# Patient Record
Sex: MALE | Race: WHITE | Employment: OTHER
[De-identification: names, ages, dates, MRNs, and addresses within clinical notes are randomized per-mention and may not be internally consistent; named-entity substitution may affect disease eponyms.]

---

## 2017-01-03 ENCOUNTER — TELEPHONE (OUTPATIENT)
Dept: CASE MANAGEMENT | Age: 62
End: 2017-01-03

## 2017-04-20 ENCOUNTER — TELEPHONE (OUTPATIENT)
Dept: CASE MANAGEMENT | Age: 62
End: 2017-04-20

## 2017-07-20 ENCOUNTER — TELEPHONE (OUTPATIENT)
Dept: CASE MANAGEMENT | Age: 62
End: 2017-07-20

## 2018-04-11 PROBLEM — G47.33 OSA (OBSTRUCTIVE SLEEP APNEA): Status: ACTIVE | Noted: 2018-04-11

## 2018-04-20 ENCOUNTER — TELEPHONE (OUTPATIENT)
Dept: CASE MANAGEMENT | Age: 63
End: 2018-04-20

## 2018-07-13 ENCOUNTER — TELEPHONE (OUTPATIENT)
Dept: CASE MANAGEMENT | Age: 63
End: 2018-07-13

## 2018-07-13 NOTE — TELEPHONE ENCOUNTER
7/13/2018 No call, encounter opened for documentation in the lung nodule navigator flow sheet/ Lung screening patient. Annual CT lung screening reminder faxed to ordering physician and mailed to patient.     [address]

## 2018-07-13 NOTE — LETTER
Lung Screening Program      7/13/2018      [address] [place] [id number]      Dear Alpa Long,        Our records indicate that based on your lung screen performed at [facility] ( formerly [facility]), it is time to schedule your yearly lung screen. National guidelines for preventive care encourage patients who smoke, or who have a history of smoking, to receive yearly lung screens if you:    ? are between the ages of 50-69  ? have smoked a pack a day or more for 30 years (or its equivalent)  ? continue to smoke or have quit within the past 15 year    To schedule a lung screen you first need to have a discussion with your primary care physician and obtain an order. Screenings are now billed to your insurance, call our patient navigator for more information at [phone]. To schedule the screen call [phone] at your earliest convenience. If you had scans that were done elsewhere,  please call to inform me; we value you as a patient and want to ensure that you are getting the appropriate care. If you have any questions or need assistance in scheduling, please dont hesitate to call.            [address]:  [phone]  F:  [phone]        CC: Fredy Lund MD                                Medical Imaging Services      7/13/2018          Fredy Lund MD  [address] [place] [address], [address] [place] [address]  FAX:  [phone]      Dear Fredy Lund MD,    Our records indicate your patient, Alpa Alves (1955) is due for his annual CT Lung Screening if he still meets screening criteria:    - Age 50-69 years  - 30 pack year smoking history  - Current smoker or former smoker who has quit within the last 15 years  - Asymptomatic of Lung Cancer    Thank you for your commitment to your patients and our CT Lung Screening Program.   If you have any additional questions or concerns regarding our

## 2018-08-23 ENCOUNTER — HOSPITAL ENCOUNTER (OUTPATIENT)
Dept: CT IMAGING | Age: 63
Discharge: HOME | End: 2018-08-25
Payer: MEDICARE

## 2018-08-23 DIAGNOSIS — Z87.891 PERSONAL HISTORY OF TOBACCO USE: ICD-10-CM

## 2018-08-23 PROCEDURE — G0297 LDCT FOR LUNG CA SCREEN: HCPCS

## 2018-08-24 ENCOUNTER — TELEPHONE (OUTPATIENT)
Dept: CASE MANAGEMENT | Age: 63
End: 2018-08-24

## 2019-07-29 ENCOUNTER — TELEPHONE (OUTPATIENT)
Dept: CASE MANAGEMENT | Age: 64
End: 2019-07-29

## 2019-07-29 NOTE — TELEPHONE ENCOUNTER
No call, encounter opened to process CT Lung Screening.       CT Lung Screen 8/23/18 :  Impression   1. Multiple stable bilateral pulmonary nodules as described above. Recommend follow-up as per Fleischner criteria below. 2. No focal consolidation, pleural effusion or pneumothorax. 3. Mild pulmonary emphysematous disease. 4. Mediastinal lymphadenopathy of uncertain etiology and clinical   significance. Recommend clinical correlation. 5. Hypodense nodule in the right thyroid lobe. Recommend clinical   correlation with T3/T4 levels and TSH levels and, if clinically   warranted, dedicated thyroid sonogram can be performed for further   evaluation.             Pack years: 64          History   Smoking Status    Former Smoker    Packs/day: 1.00    Years: 56.00    Types: Cigarettes    Quit date: 4/24/2016   Smokeless Tobacco    Never Used         7/29/2019 No call, encounter opened for documentation in the lung nodule navigator flow sheet/ Lung screening patient. Annual CT lung screening reminder mailed to patient. Reminders will be sent every 30 days until this patient schedules their screening exam or exceeds 60 days past due. Once a screening is over 60 days past due, the patient will be removed from active program surveillance and future follow up reminders.       Fortino Benjamin

## 2019-09-13 ENCOUNTER — HOSPITAL ENCOUNTER (OUTPATIENT)
Dept: CT IMAGING | Age: 64
Discharge: HOME | End: 2019-09-15
Payer: MEDICARE

## 2019-09-13 DIAGNOSIS — R91.8 PULMONARY NODULES: ICD-10-CM

## 2019-09-13 PROCEDURE — 71250 CT THORAX DX C-: CPT

## 2019-10-21 ENCOUNTER — HOSPITAL ENCOUNTER (OUTPATIENT)
Dept: CT IMAGING | Age: 64
Discharge: HOME | End: 2019-10-23
Payer: MEDICARE

## 2019-10-21 DIAGNOSIS — J91.8 PLEURAL EFFUSION ASSOCIATED WITH HEPATIC DISORDER: ICD-10-CM

## 2019-10-21 DIAGNOSIS — K76.9 PLEURAL EFFUSION ASSOCIATED WITH HEPATIC DISORDER: ICD-10-CM

## 2019-10-21 PROCEDURE — 6360000004 HC RX CONTRAST MEDICATION: Performed by: RADIOLOGY

## 2019-10-21 PROCEDURE — 74177 CT ABD & PELVIS W/CONTRAST: CPT

## 2019-10-21 RX ADMIN — IOHEXOL 50 ML: 240 INJECTION, SOLUTION INTRATHECAL; INTRAVASCULAR; INTRAVENOUS; ORAL at 14:16

## 2019-10-21 RX ADMIN — IOPAMIDOL 110 ML: 755 INJECTION, SOLUTION INTRAVENOUS at 14:15

## 2021-06-16 ENCOUNTER — HOSPITAL ENCOUNTER (OUTPATIENT)
Dept: CT IMAGING | Age: 66
Discharge: HOME | End: 2021-06-18
Payer: MEDICARE

## 2021-06-16 DIAGNOSIS — R91.1 PULMONARY NODULE: ICD-10-CM

## 2021-06-16 PROCEDURE — 71250 CT THORAX DX C-: CPT

## 2021-09-01 ENCOUNTER — APPOINTMENT (OUTPATIENT)
Dept: GENERAL RADIOLOGY | Age: 66
DRG: 189 | End: 2021-09-01
Payer: MEDICARE

## 2021-09-01 ENCOUNTER — HOSPITAL ENCOUNTER (INPATIENT)
Age: 66
LOS: 2 days | Discharge: HOME | DRG: 189 | End: 2021-09-04
Attending: EMERGENCY MEDICINE | Admitting: FAMILY MEDICINE
Payer: MEDICARE

## 2021-09-01 DIAGNOSIS — J44.1 COPD EXACERBATION (HCC): Primary | ICD-10-CM

## 2021-09-01 DIAGNOSIS — R06.00 DYSPNEA, UNSPECIFIED TYPE: ICD-10-CM

## 2021-09-01 DIAGNOSIS — R09.02 HYPOXIA: ICD-10-CM

## 2021-09-01 LAB
ALBUMIN SERPL-MCNC: 4.7 G/DL (ref 3.5–5.2)
ALP BLD-CCNC: 76 U/L (ref 40–129)
ALT SERPL-CCNC: 23 U/L (ref 0–40)
ANION GAP SERPL CALCULATED.3IONS-SCNC: 14 MMOL/L (ref 7–16)
APTT: 34.4 SEC (ref 24.5–35.1)
AST SERPL-CCNC: 28 U/L (ref 0–39)
BASOPHILS ABSOLUTE: 0.04 E9/L (ref 0–0.2)
BASOPHILS RELATIVE PERCENT: 0.3 % (ref 0–2)
BILIRUB SERPL-MCNC: 0.3 MG/DL (ref 0–1.2)
BUN BLDV-MCNC: 9 MG/DL (ref 6–23)
CALCIUM SERPL-MCNC: 9.5 MG/DL (ref 8.6–10.2)
CHLORIDE BLD-SCNC: 99 MMOL/L (ref 98–107)
CO2: 27 MMOL/L (ref 22–29)
CREAT SERPL-MCNC: 0.7 MG/DL (ref 0.7–1.2)
EOSINOPHILS ABSOLUTE: 0.29 E9/L (ref 0.05–0.5)
EOSINOPHILS RELATIVE PERCENT: 2 % (ref 0–6)
GFR AFRICAN AMERICAN: >60
GFR NON-AFRICAN AMERICAN: >60 ML/MIN/1.73
GLUCOSE BLD-MCNC: 112 MG/DL (ref 74–99)
HCT VFR BLD CALC: 49.2 % (ref 37–54)
HEMOGLOBIN: 16.7 G/DL (ref 12.5–16.5)
IMMATURE GRANULOCYTES #: 0.05 E9/L
IMMATURE GRANULOCYTES %: 0.3 % (ref 0–5)
LYMPHOCYTES ABSOLUTE: 1.43 E9/L (ref 1.5–4)
LYMPHOCYTES RELATIVE PERCENT: 9.9 % (ref 20–42)
MAGNESIUM: 1.6 MG/DL (ref 1.6–2.6)
MCH RBC QN AUTO: 30.3 PG (ref 26–35)
MCHC RBC AUTO-ENTMCNC: 33.9 % (ref 32–34.5)
MCV RBC AUTO: 89.3 FL (ref 80–99.9)
MONOCYTES ABSOLUTE: 1.39 E9/L (ref 0.1–0.95)
MONOCYTES RELATIVE PERCENT: 9.6 % (ref 2–12)
NEUTROPHILS ABSOLUTE: 11.21 E9/L (ref 1.8–7.3)
NEUTROPHILS RELATIVE PERCENT: 77.9 % (ref 43–80)
PDW BLD-RTO: 13.1 FL (ref 11.5–15)
PLATELET # BLD: 335 E9/L (ref 130–450)
PMV BLD AUTO: 10.4 FL (ref 7–12)
POTASSIUM SERPL-SCNC: 4.3 MMOL/L (ref 3.5–5)
PRO-BNP: 512 PG/ML (ref 0–125)
RBC # BLD: 5.51 E12/L (ref 3.8–5.8)
SARS-COV-2, NAAT: NOT DETECTED
SODIUM BLD-SCNC: 140 MMOL/L (ref 132–146)
TOTAL PROTEIN: 8.6 G/DL (ref 6.4–8.3)
TROPONIN, HIGH SENSITIVITY: 12 NG/L (ref 0–11)
WBC # BLD: 14.4 E9/L (ref 4.5–11.5)

## 2021-09-01 PROCEDURE — 93005 ELECTROCARDIOGRAM TRACING: CPT | Performed by: NURSE PRACTITIONER

## 2021-09-01 PROCEDURE — 6370000000 HC RX 637 (ALT 250 FOR IP): Performed by: STUDENT IN AN ORGANIZED HEALTH CARE EDUCATION/TRAINING PROGRAM

## 2021-09-01 PROCEDURE — 94640 AIRWAY INHALATION TREATMENT: CPT

## 2021-09-01 PROCEDURE — 85730 THROMBOPLASTIN TIME PARTIAL: CPT

## 2021-09-01 PROCEDURE — 94660 CPAP INITIATION&MGMT: CPT

## 2021-09-01 PROCEDURE — 2700000000 HC OXYGEN THERAPY PER DAY

## 2021-09-01 PROCEDURE — 80053 COMPREHEN METABOLIC PANEL: CPT

## 2021-09-01 PROCEDURE — 87635 SARS-COV-2 COVID-19 AMP PRB: CPT

## 2021-09-01 PROCEDURE — 85025 COMPLETE CBC W/AUTO DIFF WBC: CPT

## 2021-09-01 PROCEDURE — 83880 ASSAY OF NATRIURETIC PEPTIDE: CPT

## 2021-09-01 PROCEDURE — 94664 DEMO&/EVAL PT USE INHALER: CPT

## 2021-09-01 PROCEDURE — 6360000002 HC RX W HCPCS: Performed by: STUDENT IN AN ORGANIZED HEALTH CARE EDUCATION/TRAINING PROGRAM

## 2021-09-01 PROCEDURE — 83735 ASSAY OF MAGNESIUM: CPT

## 2021-09-01 PROCEDURE — 99285 EMERGENCY DEPT VISIT HI MDM: CPT

## 2021-09-01 PROCEDURE — 84484 ASSAY OF TROPONIN QUANT: CPT

## 2021-09-01 PROCEDURE — 71045 X-RAY EXAM CHEST 1 VIEW: CPT

## 2021-09-01 RX ORDER — IPRATROPIUM BROMIDE AND ALBUTEROL SULFATE 2.5; .5 MG/3ML; MG/3ML
3 SOLUTION RESPIRATORY (INHALATION) ONCE
Status: COMPLETED | OUTPATIENT
Start: 2021-09-01 | End: 2021-09-01

## 2021-09-01 RX ORDER — METHYLPREDNISOLONE SODIUM SUCCINATE 125 MG/2ML
125 INJECTION, POWDER, LYOPHILIZED, FOR SOLUTION INTRAMUSCULAR; INTRAVENOUS ONCE
Status: COMPLETED | OUTPATIENT
Start: 2021-09-01 | End: 2021-09-01

## 2021-09-01 RX ADMIN — IPRATROPIUM BROMIDE AND ALBUTEROL SULFATE 3 AMPULE: .5; 2.5 SOLUTION RESPIRATORY (INHALATION) at 20:07

## 2021-09-01 RX ADMIN — METHYLPREDNISOLONE SODIUM SUCCINATE 125 MG: 125 INJECTION, POWDER, FOR SOLUTION INTRAMUSCULAR; INTRAVENOUS at 20:46

## 2021-09-01 ASSESSMENT — ENCOUNTER SYMPTOMS
APNEA: 0
EYE REDNESS: 0
COUGH: 1
TROUBLE SWALLOWING: 0
BACK PAIN: 0
WHEEZING: 0
VOMITING: 0
RHINORRHEA: 0
CHEST TIGHTNESS: 0
NAUSEA: 0
SHORTNESS OF BREATH: 1
SORE THROAT: 0
ABDOMINAL PAIN: 0
PHOTOPHOBIA: 0
EYE PAIN: 0
CONSTIPATION: 0
DIARRHEA: 0

## 2021-09-01 NOTE — ED NOTES
FIRST PROVIDER CONTACT ASSESSMENT NOTE                                                                                                Department of Emergency Medicine                                                      First Provider Note  21  7:40 PM EDT  NAME: Ronda Cruz  : 1955  MRN: [id number]    Chief Complaint: Shortness of Breath (pt reports increased sob and cough. )      History of Present Illness:   Ronda Cruz is a 74 y.o. male who presents to the ED for sudden onset of severe shortness of breath. Patient presents emergency department, states that at baseline he does have COPD does not require any oxygen, he just drove in from [place] he was there for a day, states that there he began to feel very short of breath and ambulance was in the parking lot they checked an EKG on him and patient then drove home. Patient hypoxic on arrival here 87% on room air. States day before he started having a sore throat. Patient notably distressed. Focused Physical Exam:  VS:    ED Triage Vitals [21]   BP Temp Temp src Pulse Resp SpO2 Height Weight   -- 96.8 °F (36 °C) -- 107 -- (!) 87 % -- --        General: Alert and in no apparent distress. Medical History:  has a past medical history of Hypertension and Sleep apnea. Surgical History:  has a past surgical history that includes eye surgery; Retinal detachment surgery (Right); and repair retinal tear/hole (Right). Social History:  reports that he quit smoking about 5 years ago. His smoking use included cigarettes. He started smoking about 59 years ago. He has a 56.00 pack-year smoking history. He has never used smokeless tobacco. He reports current alcohol use. He reports that he does not use drugs. Family History: Family history is unknown by patient. Allergies: Patient has no known allergies.      Initial Plan of Care:  Initiate Treatment-Testing, Proceed toTreatment Area When Bed Available for ED Attending/MLP to Continue Care    -------------------------------------------------END OF FIRST PROVIDER CONTACT ASSESSMENT NOTE--------------------------------------------------------  Electronically signed by IMKO Ley CNP   DD: 9/1/21         MIKO Ley CNP  09/01/21 1941

## 2021-09-01 NOTE — ED NOTES
Bed: 17A-17  Expected date:   Expected time:   Means of arrival:   Comments:  Triage male     Jeffry Oliva RN  09/01/21 1942

## 2021-09-02 ENCOUNTER — APPOINTMENT (OUTPATIENT)
Dept: CT IMAGING | Age: 66
DRG: 189 | End: 2021-09-02
Payer: MEDICARE

## 2021-09-02 PROBLEM — J44.1 COPD EXACERBATION (HCC): Status: ACTIVE | Noted: 2021-09-02

## 2021-09-02 LAB
ADENOVIRUS BY PCR: NOT DETECTED
BORDETELLA PARAPERTUSSIS BY PCR: NOT DETECTED
BORDETELLA PERTUSSIS BY PCR: NOT DETECTED
CHLAMYDOPHILIA PNEUMONIAE BY PCR: NOT DETECTED
CORONAVIRUS 229E BY PCR: NOT DETECTED
CORONAVIRUS HKU1 BY PCR: NOT DETECTED
CORONAVIRUS NL63 BY PCR: NOT DETECTED
CORONAVIRUS OC43 BY PCR: NOT DETECTED
EKG ATRIAL RATE: 78 BPM
EKG P AXIS: 77 DEGREES
EKG P-R INTERVAL: 166 MS
EKG Q-T INTERVAL: 398 MS
EKG QRS DURATION: 86 MS
EKG QTC CALCULATION (BAZETT): 453 MS
EKG R AXIS: 64 DEGREES
EKG T AXIS: 45 DEGREES
EKG VENTRICULAR RATE: 78 BPM
HUMAN METAPNEUMOVIRUS BY PCR: NOT DETECTED
HUMAN RHINOVIRUS/ENTEROVIRUS BY PCR: DETECTED
INFLUENZA A BY PCR: NOT DETECTED
INFLUENZA B BY PCR: NOT DETECTED
MYCOPLASMA PNEUMONIAE BY PCR: NOT DETECTED
PARAINFLUENZA VIRUS 1 BY PCR: NOT DETECTED
PARAINFLUENZA VIRUS 2 BY PCR: NOT DETECTED
PARAINFLUENZA VIRUS 3 BY PCR: NOT DETECTED
PARAINFLUENZA VIRUS 4 BY PCR: NOT DETECTED
RESPIRATORY SYNCYTIAL VIRUS BY PCR: NOT DETECTED
SARS-COV-2, PCR: NOT DETECTED
TROPONIN, HIGH SENSITIVITY: 10 NG/L (ref 0–11)

## 2021-09-02 PROCEDURE — 94640 AIRWAY INHALATION TREATMENT: CPT

## 2021-09-02 PROCEDURE — 6370000000 HC RX 637 (ALT 250 FOR IP): Performed by: FAMILY MEDICINE

## 2021-09-02 PROCEDURE — 84484 ASSAY OF TROPONIN QUANT: CPT

## 2021-09-02 PROCEDURE — 36415 COLL VENOUS BLD VENIPUNCTURE: CPT

## 2021-09-02 PROCEDURE — 94660 CPAP INITIATION&MGMT: CPT

## 2021-09-02 PROCEDURE — 6360000002 HC RX W HCPCS: Performed by: FAMILY MEDICINE

## 2021-09-02 PROCEDURE — 6370000000 HC RX 637 (ALT 250 FOR IP): Performed by: NURSE PRACTITIONER

## 2021-09-02 PROCEDURE — 2580000003 HC RX 258: Performed by: FAMILY MEDICINE

## 2021-09-02 PROCEDURE — 6360000002 HC RX W HCPCS: Performed by: INTERNAL MEDICINE

## 2021-09-02 PROCEDURE — 6360000004 HC RX CONTRAST MEDICATION: Performed by: RADIOLOGY

## 2021-09-02 PROCEDURE — 2700000000 HC OXYGEN THERAPY PER DAY

## 2021-09-02 PROCEDURE — 2140000000 HC CCU INTERMEDIATE R&B

## 2021-09-02 PROCEDURE — 0202U NFCT DS 22 TRGT SARS-COV-2: CPT

## 2021-09-02 PROCEDURE — 71275 CT ANGIOGRAPHY CHEST: CPT

## 2021-09-02 RX ORDER — AMLODIPINE BESYLATE AND BENAZEPRIL HYDROCHLORIDE 5; 10 MG/1; MG/1
1 CAPSULE ORAL DAILY
Status: DISCONTINUED | OUTPATIENT
Start: 2021-09-02 | End: 2021-09-02 | Stop reason: CLARIF

## 2021-09-02 RX ORDER — PREDNISONE 20 MG/1
40 TABLET ORAL DAILY
Status: DISCONTINUED | OUTPATIENT
Start: 2021-09-04 | End: 2021-09-04 | Stop reason: HOSPADM

## 2021-09-02 RX ORDER — ACETAMINOPHEN 325 MG/1
650 TABLET ORAL EVERY 6 HOURS PRN
Status: DISCONTINUED | OUTPATIENT
Start: 2021-09-02 | End: 2021-09-04 | Stop reason: HOSPADM

## 2021-09-02 RX ORDER — ASPIRIN 81 MG/1
81 TABLET, CHEWABLE ORAL DAILY
Status: DISCONTINUED | OUTPATIENT
Start: 2021-09-02 | End: 2021-09-04 | Stop reason: HOSPADM

## 2021-09-02 RX ORDER — POLYETHYLENE GLYCOL 3350 17 G/17G
17 POWDER, FOR SOLUTION ORAL DAILY PRN
Status: DISCONTINUED | OUTPATIENT
Start: 2021-09-02 | End: 2021-09-04 | Stop reason: HOSPADM

## 2021-09-02 RX ORDER — AMLODIPINE BESYLATE 5 MG/1
5 TABLET ORAL DAILY
Status: DISCONTINUED | OUTPATIENT
Start: 2021-09-02 | End: 2021-09-04 | Stop reason: HOSPADM

## 2021-09-02 RX ORDER — METHYLPREDNISOLONE SODIUM SUCCINATE 125 MG/2ML
40 INJECTION, POWDER, LYOPHILIZED, FOR SOLUTION INTRAMUSCULAR; INTRAVENOUS EVERY 6 HOURS
Status: COMPLETED | OUTPATIENT
Start: 2021-09-02 | End: 2021-09-03

## 2021-09-02 RX ORDER — ATORVASTATIN CALCIUM 20 MG/1
20 TABLET, FILM COATED ORAL DAILY
Status: DISCONTINUED | OUTPATIENT
Start: 2021-09-02 | End: 2021-09-04 | Stop reason: HOSPADM

## 2021-09-02 RX ORDER — LISINOPRIL 10 MG/1
10 TABLET ORAL DAILY
Status: DISCONTINUED | OUTPATIENT
Start: 2021-09-02 | End: 2021-09-04 | Stop reason: HOSPADM

## 2021-09-02 RX ORDER — ACETAMINOPHEN 650 MG/1
650 SUPPOSITORY RECTAL EVERY 6 HOURS PRN
Status: DISCONTINUED | OUTPATIENT
Start: 2021-09-02 | End: 2021-09-04 | Stop reason: HOSPADM

## 2021-09-02 RX ORDER — AZITHROMYCIN 250 MG/1
500 TABLET, FILM COATED ORAL DAILY
Status: DISCONTINUED | OUTPATIENT
Start: 2021-09-02 | End: 2021-09-04 | Stop reason: HOSPADM

## 2021-09-02 RX ORDER — IPRATROPIUM BROMIDE AND ALBUTEROL SULFATE 2.5; .5 MG/3ML; MG/3ML
1 SOLUTION RESPIRATORY (INHALATION)
Status: DISCONTINUED | OUTPATIENT
Start: 2021-09-02 | End: 2021-09-04 | Stop reason: HOSPADM

## 2021-09-02 RX ORDER — SODIUM CHLORIDE 9 MG/ML
INJECTION, SOLUTION INTRAVENOUS CONTINUOUS
Status: DISCONTINUED | OUTPATIENT
Start: 2021-09-02 | End: 2021-09-04 | Stop reason: HOSPADM

## 2021-09-02 RX ORDER — ARFORMOTEROL TARTRATE 15 UG/2ML
15 SOLUTION RESPIRATORY (INHALATION) 2 TIMES DAILY
Status: DISCONTINUED | OUTPATIENT
Start: 2021-09-02 | End: 2021-09-04 | Stop reason: HOSPADM

## 2021-09-02 RX ORDER — ALBUTEROL SULFATE 2.5 MG/3ML
2.5 SOLUTION RESPIRATORY (INHALATION)
Status: DISCONTINUED | OUTPATIENT
Start: 2021-09-02 | End: 2021-09-04 | Stop reason: HOSPADM

## 2021-09-02 RX ORDER — ONDANSETRON 4 MG/1
4 TABLET, ORALLY DISINTEGRATING ORAL EVERY 8 HOURS PRN
Status: DISCONTINUED | OUTPATIENT
Start: 2021-09-02 | End: 2021-09-04 | Stop reason: HOSPADM

## 2021-09-02 RX ORDER — ONDANSETRON 2 MG/ML
4 INJECTION INTRAMUSCULAR; INTRAVENOUS EVERY 6 HOURS PRN
Status: DISCONTINUED | OUTPATIENT
Start: 2021-09-02 | End: 2021-09-04 | Stop reason: HOSPADM

## 2021-09-02 RX ORDER — BUDESONIDE 0.5 MG/2ML
0.5 INHALANT ORAL 2 TIMES DAILY
Status: DISCONTINUED | OUTPATIENT
Start: 2021-09-02 | End: 2021-09-04 | Stop reason: HOSPADM

## 2021-09-02 RX ADMIN — METHYLPREDNISOLONE SODIUM SUCCINATE 40 MG: 125 INJECTION, POWDER, FOR SOLUTION INTRAMUSCULAR; INTRAVENOUS at 20:00

## 2021-09-02 RX ADMIN — AZITHROMYCIN 500 MG: 250 TABLET, FILM COATED ORAL at 14:10

## 2021-09-02 RX ADMIN — AMLODIPINE BESYLATE 5 MG: 5 TABLET ORAL at 08:19

## 2021-09-02 RX ADMIN — IPRATROPIUM BROMIDE AND ALBUTEROL SULFATE 1 AMPULE: .5; 2.5 SOLUTION RESPIRATORY (INHALATION) at 16:04

## 2021-09-02 RX ADMIN — ASPIRIN 81 MG CHEWABLE TABLET 81 MG: 81 TABLET CHEWABLE at 08:19

## 2021-09-02 RX ADMIN — ARFORMOTEROL TARTRATE 15 MCG: 15 SOLUTION RESPIRATORY (INHALATION) at 19:55

## 2021-09-02 RX ADMIN — SODIUM CHLORIDE: 9 INJECTION, SOLUTION INTRAVENOUS at 18:00

## 2021-09-02 RX ADMIN — IOPAMIDOL 75 ML: 755 INJECTION, SOLUTION INTRAVENOUS at 00:09

## 2021-09-02 RX ADMIN — METHYLPREDNISOLONE SODIUM SUCCINATE 40 MG: 125 INJECTION, POWDER, FOR SOLUTION INTRAMUSCULAR; INTRAVENOUS at 04:19

## 2021-09-02 RX ADMIN — SODIUM CHLORIDE: 9 INJECTION, SOLUTION INTRAVENOUS at 04:21

## 2021-09-02 RX ADMIN — IPRATROPIUM BROMIDE AND ALBUTEROL SULFATE 1 AMPULE: .5; 2.5 SOLUTION RESPIRATORY (INHALATION) at 19:56

## 2021-09-02 RX ADMIN — ATORVASTATIN CALCIUM 20 MG: 20 TABLET, FILM COATED ORAL at 08:19

## 2021-09-02 RX ADMIN — IPRATROPIUM BROMIDE AND ALBUTEROL SULFATE 1 AMPULE: .5; 2.5 SOLUTION RESPIRATORY (INHALATION) at 08:10

## 2021-09-02 RX ADMIN — ENOXAPARIN SODIUM 40 MG: 40 INJECTION SUBCUTANEOUS at 08:19

## 2021-09-02 RX ADMIN — IPRATROPIUM BROMIDE AND ALBUTEROL SULFATE 1 AMPULE: .5; 2.5 SOLUTION RESPIRATORY (INHALATION) at 12:00

## 2021-09-02 RX ADMIN — LISINOPRIL 10 MG: 10 TABLET ORAL at 08:19

## 2021-09-02 RX ADMIN — METHYLPREDNISOLONE SODIUM SUCCINATE 40 MG: 125 INJECTION, POWDER, FOR SOLUTION INTRAMUSCULAR; INTRAVENOUS at 10:07

## 2021-09-02 RX ADMIN — METHYLPREDNISOLONE SODIUM SUCCINATE 40 MG: 125 INJECTION, POWDER, FOR SOLUTION INTRAMUSCULAR; INTRAVENOUS at 16:29

## 2021-09-02 RX ADMIN — BUDESONIDE 500 MCG: 0.5 SUSPENSION RESPIRATORY (INHALATION) at 19:55

## 2021-09-02 ASSESSMENT — PAIN SCALES - GENERAL
PAINLEVEL_OUTOF10: 0

## 2021-09-02 NOTE — CONSULTS
Xochitl Beltran M.D.,[facility]  Hayley Chavarria D.O., F.A.CLARE.ORITA., Jay Clement M.D. Stone Metcalf M.D. Ellie Ramírez D.O. Patient:  Grace Narvaez 74 y.o. male MRN: [id number]     Date of Service: 9/2/2021      PULMONARY CONSULTATION    Reason for Consultation: COPD exacerbation  Referring Physician: Dr. Renetta Mcclellan with the referring physician will be sent via the electronic medical record. Chief Complaint: shortness of breath    CODE STATUS: full code    SUBJECTIVE: full code  HPI:  Grace Narvaez is a 74 y.o. male with a past medical history of hypertension, FRANCOISE on CPAP, noncompliance with treatment, and stage 2 COPD, previously known in our practice to Dr. Cynthia Sanabria who initially presented to the ED for sudden onset severe shortness of breath. He was hypoxic on presentation at 87%. He did have a sore throat for one day, before he started with the shortness of breath. Patient was seen and examined lying in bed in no apparent distress. He says he has had no more wheezing since getting a breathing treatment. He uses Anoro at home, but does not have a rescue inhaler and has not worn his CPAP in a couple of years. He is still smoking cigars. He has an occasional cough with clear sputum.      Past Medical History:   Diagnosis Date    Hypertension     Sleep apnea        Past Surgical History:   Procedure Laterality Date    EYE SURGERY      REPAIR RETINAL TEAR/HOLE Right     RETINAL DETACHMENT SURGERY Right        Family History   Family history unknown: Yes       Social History:   Social History     Socioeconomic History    Marital status:      Spouse name: Not on file    Number of children: Not on file    Years of education: Not on file    Highest education level: Not on file   Occupational History    Occupation: retired-firefoe   Tobacco Use    Smoking status: Former Smoker     Packs/day: 1.00     Years: 56.00     Pack years: 56.00     Types: Cigarettes     Start date: 1962     Quit date: 2016     Years since quittin.3    Smokeless tobacco: Never Used   Vaping Use    Vaping Use: Never used   Substance and Sexual Activity    Alcohol use: Yes     Alcohol/week: 0.0 standard drinks     Comment: occasional    Drug use: No    Sexual activity: Not Currently     Partners: Female   Other Topics Concern    Not on file   Social History Narrative    Not on file     Social Determinants of Health     Financial Resource Strain:     Difficulty of Paying Living Expenses:    Food Insecurity:     Worried About Running Out of Food in the Last Year:     [address] in the Last Year:    Transportation Needs:     Lack of Transportation (Medical):  Lack of Transportation (Non-Medical):    Physical Activity:     Days of Exercise per Week:     Minutes of Exercise per Session:    Stress:     Feeling of Stress :    Social Connections:     Frequency of Communication with Friends and Family:     Frequency of Social Gatherings with Friends and Family:     Attends Worship Services:     Active Member of Clubs or Organizations:     Attends Club or Organization Meetings:     Marital Status:    Intimate Partner Violence:     Fear of Current or Ex-Partner:     Emotionally Abused:     Physically Abused:     Sexually Abused:      Smoking history: The patient is a Current smoker cigars. He is a former cigarette smoker of one pack a day for 45 years. Pack year equal 45. ETOH:   reports current alcohol use. Exposures: There  is not history of TB or TB exposure. There is asbestos or silica dust exposure. The patient reports does not have coal, foundry, quarry or [employer] exposure. 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 There is not  history of recreational or IV drug use. There is hot tub exposure at home but not very recently.  The patient does not have any exotic pets, turtles or exotic birds.        Vaccines:    Influenza:  Up-to-date  Pneumococcal Polysaccharide:  Up-to-date; approximate date: 2017  covid vaccine current  Immunization History   Administered Date(s) Administered    COVID-19, Pfizer, PF, 30mcg/0.3mL 02/25/2021, 03/18/2021    Influenza Vaccine, unspecified formulation 09/28/2016    Influenza, MDCK Quadv, with preserv IM (Flucelvax 2 yrs and older) 10/10/2018, 10/17/2019    Influenza, Bae Pulse, IM, PF (6 mo and older Fluzone, Flulaval, Fluarix, and 3 yrs and older Afluria) 01/06/2018    Influenza, Triv, 3 Years and older, IM (Afluria (5 yrs and older) 09/28/2016    Pneumococcal Polysaccharide ([id number]) 09/13/2017    Tdap (Boostrix, Adacel) 04/16/2019        Home Meds: Medications Prior to Admission: Gayatri Delgado 9-4.8 MCG/ACT AERO, USE 2 INHALATIONS TWICE A DAY  [employer] Natural Products (OSTEO BI-FLEX JOINT SHIELD) TABS, Take by mouth daily  simvastatin (ZOCOR) 40 MG tablet, Take 40 mg by mouth daily   Polyvinyl Alcohol-Povidone (REFRESH OP), Apply to eye  aspirin 81 MG tablet, Take 81 mg by mouth daily  Multiple Vitamins-Minerals (THERAPEUTIC MULTIVITAMIN-MINERALS) tablet, Take 1 tablet by mouth daily  LOTEMAX 0.5 % ophthalmic gel,   amLODIPine-benazepril (LOTREL) 5-10 MG per capsule, Take 1 capsule by mouth daily  albuterol sulfate (PROAIR RESPICLICK) 079 (90 BASE) MCG/ACT aerosol powder inhalation, Inhale 2 puffs into the lungs every 4 hours as needed for Wheezing or Shortness of Breath    CURRENT MEDS :  Scheduled Meds:   aspirin  81 mg Oral Daily    atorvastatin  20 mg Oral Daily    enoxaparin  40 mg SubCUTAneous Daily    ipratropium-albuterol  1 ampule Inhalation Q4H WA    methylPREDNISolone  40 mg IntraVENous Q6H    Followed by   Mahnaz Shin ON 9/4/2021] predniSONE  40 mg Oral Daily    amLODIPine  5 mg Oral Daily    And    lisinopril  10 mg Oral Daily       Continuous Infusions:   sodium chloride 75 mL/hr at 09/02/21 0421       No Known Allergies    REVIEW OF SYSTEMS:  Constitutional: Denies fever, weight loss, night sweats, and fatigue  Skin: Denies pigmentation, dark lesions, and rashes   HEENT: Denies hearing loss, tinnitus, ear drainage, epistaxis, sore throat, and hoarseness. Cardiovascular: Denies palpitations, chest pain, and chest pressure. Respiratory: Denies productive cough, dyspnea at rest, hemoptysis, apnea, and choking. Gastrointestinal: Denies nausea, vomiting, poor appetite, diarrhea, heartburn or reflux  Genitourinary: Denies dysuria, frequency, urgency or hematuria  Musculoskeletal: Denies myalgias, muscle weakness, and bone pain  Neurological: Denies dizziness, vertigo, headache, and focal weakness  Psychological: Denies anxiety and depression  Endocrine: Denies heat intolerance and cold intolerance  Hematopoietic/Lymphatic: Denies bleeding problems and blood transfusions    OBJECTIVE:   /85   Pulse 75   Temp 97.6 °F (36.4 °C) (Temporal)   Resp 18   Ht 5' 9\" (1.753 m)   Wt 168 lb 3.2 oz (76.3 kg)   SpO2 97%   BMI 24.84 kg/m²   SpO2 Readings from Last 1 Encounters:   09/02/21 97%        I/O:    Intake/Output Summary (Last 24 hours) at 9/2/2021 1023  Last data filed at 9/2/2021 0536  Gross per 24 hour   Intake 360 ml   Output --   Net 360 ml     Vent Information  FiO2 : 40 %  SpO2: 97 %  SpO2/FiO2 ratio: 250  I Time/ I Time %: 0.9 s  Mask Type: Full face mask  Mask Size: Large       IPAP: 14 cmH20  CPAP/EPAP: 8 cmH2O     Physical Exam:  General: The patient is lying in bed comfortably without any distress. Breathing is not labored  HEENT: Pupils are equal round and reactive to light, there are no oral lesions and no post-nasal drip   Neck: supple without adenopathy  Cardiovascular: regular rate and rhythm without murmur or gallop  Respiratory: Clear to auscultation bilaterally without wheezing or crackles.   Air entry is symmetric  Abdomen: soft, non-tender, non-distended, normal bowel sounds  Extremities: warm, no edema, no clubbing  Skin: no rash or lesion  Neurologic: CN II-XII grossly intact, no focal deficits    Pulmonary Function Testing personally reviewed and interpreted  Last done in 2016    Imaging personally reviewed:  9/2/2021 CTA chest  No evidence of pulmonary embolism or acute pulmonary abnormality.       Moderate emphysema. 9/1/2021 cxr  No acute process. Echo:  None on file    Labs:  Lab Results   Component Value Date    WBC 14.4 09/01/2021    HGB 16.7 09/01/2021    HCT 49.2 09/01/2021    MCV 89.3 09/01/2021    MCH 30.3 09/01/2021    MCHC 33.9 09/01/2021    RDW 13.1 09/01/2021     09/01/2021    MPV 10.4 09/01/2021     Lab Results   Component Value Date     09/01/2021    K 4.3 09/01/2021    CL 99 09/01/2021    CO2 27 09/01/2021    BUN 9 09/01/2021    CREATININE 0.7 09/01/2021    LABALBU 4.7 09/01/2021    CALCIUM 9.5 09/01/2021    GFRAA >60 09/01/2021    LABGLOM >60 09/01/2021     No results found for: PROTIME, INR  Recent Labs     09/01/21 2038   PROBNP 512*       Assessment:  1. COPD with acute exacerbation  2. FRANCOISE not compliant with CPAP treatment  3. Acute respiratory insufficiency with hypoxia   4. hyperlipidemia    Plan:  1. Oxygen therapy as needed, currently on 5L, wean as tolerated  2. Brovana/budesonide via nebulizers BID  3. Duonebs q4 hours  4. Solu medrol to Prednisone wean  5. Azithromycin 500mg daily x5 days  6. DVT prophylaxis    Thank you for allowing me to participate in the care of Ronda Cruz. Please feel free to call with questions. This plan of care was reviewed in collaboration with Dr. Anthony Crocker    Electronically signed by MIKO Colunga CNP on 9/2/2021 at 10:23 AM      Note: This report was completed utilizing computer voice recognition software. Every effort has been made to ensure accuracy, however; inadvertent computerized transcription errors may be present    I personally saw, examined, and cared for the patient.  Labs, medications, radiographs reviewed. I agree with history exam and plans detailed in NP note with the following additions:    Mr. Severa Moan is a 74year old male formerly known to Dr. Meagan Stockton with a history of COPD who presented with URI symptoms, wheezing, SOB, and cough. CTA of the chest did not show any PE or pneumonia. He was wheezing upon arrival.  For me today he had a faint end exp wheeze. We will treat him for a copd exacerbation with IV steroids, bronchodilators, azithromycin. Needs smoking cessation. Check a full viral panel.     Electronically signed by Terry Bowman MD on 9/2/2021 at 12:52 PM

## 2021-09-02 NOTE — CARE COORDINATION
Transition of care: Pulmonary consulted. Met with pt in room. Pt lives with his wife, Annabella, in a 2 story home. Independent with ADLs and drives. Not a . DME- CPAP and a pulse ox. Discharge plan is to return home. If oxygen is needed, he is agreeable to use [facility] as long as they accept his insurance. PCP is Dr LAZARA Price and pharmacy is [facility] order and [facility] in [facility].  Sw/cm will follow

## 2021-09-02 NOTE — PROGRESS NOTES
Date: 9/1/2021    Time: 8:16 PM    Patient Placed On BIPAP/CPAP/ Non-Invasive Ventilation? Yes    If no must comment. Facial area red/color change? No           If YES are Blister/Lesion present? No   If yes must notify nursing staff  BIPAP/CPAP skin barrier?   Yes    Skin barrier type:mepilexlite       Comments:        Carloyn Romberg, RCP

## 2021-09-02 NOTE — H&P
[address]                  [address] [facility],  [facility]                              HISTORY AND PHYSICAL    PATIENT NAME: Kayden Amin                       :        1955  MED REC NO:   [id number]                            ROOM:       1325  ACCOUNT NO:   [de-identified]                           ADMIT DATE: 2021  PROVIDER:     Dalila Stafford MD    CHIEF COMPLAINT:  Shortness of breath. HISTORY OF PRESENTING ILLNESS:  A 66-year-old with a history of  pulmonary nodules, emphysema, who presented to the emergency room with  increasing shortness of breath. The patient states he started feeling  sick on Monday with cough, runny nose, but no fever. States breathing  became more labored as the week went on prompting him to come to the  emergency room yesterday. He was found to be wheezing and hypoxic and  admitted for the same. The patient states he feels somewhat better. Denies any fever or chills. COVID was negative. He has had both  vaccines. RECENT AND PRESENT MEDICATION:  See med rec in the chart. PAST MEDICAL HISTORY:  Positive for emphysema, positive for pulmonary  nodules which is followed by the pulmonologist, positive for sleep  apnea, benign thyroid nodule, and hypertension. SOCIAL HISTORY:  Quit smoking in 2016. Denies alcohol use. FAMILY MEDICAL HISTORY:  Noncontributory. ALLERGIES:  None known. REVIEW OF SYSTEMS:  SKIN/LYMPHATICS:  Negative. CENTRAL NERVOUS SYSTEM:  Negative. CIRCULATORY:  Negative. RESPIRATORY:  See HPI. DIGESTIVE:  Denies nausea, vomiting, diarrhea, melena, hematochezia. GENITOURINARY:  Denies dysuria, frequency, hematuria. MUSCULOSKELETAL:  Negative. PHYSICAL EXAMINATION:  GENERAL:  The patient is sitting in bed, in no acute distress. VITAL SIGNS:  Temperature 97.6, pulse 75, respirations 18, blood  pressure 131/85, O2 sat is 98% on nasal cannula.   NECK:  Supple without bruits or masses. HEART:  Distant, but regular rate and rhythm. CHEST:  Does show decreased aeration with end-expiratory wheezing  throughout. ABDOMEN:  Soft, nontender without any mass. EXTREMITIES:  Reveal no cyanosis, clubbing, or edema and no calf  tenderness. NEUROLOGICAL:  He is grossly intact. LABORATORY DATA:  CMP basically unremarkable. CBC:  White count is  14.4, hemoglobin 16.7. DIAGNOSTIC DATA:  CT of the chest shows no PE, does show emphysema. COVID test was negative. DIAGNOSIS:  Exacerbation of COPD, possibly viral illness, does not  appear to be COVID at this point. PLAN:  The patient was admitted. IV fluids. Aerosols, O2, steroids. Pulmonary consultation. DVT prophylaxis. Discharge plan will be home  once stable.         Tulio Villalobos MD    D: 09/02/2021 8:33:17       T: 09/02/2021 8:36:21     [email or web address]  Job#: [id number]     Doc#: [id number]    CC:

## 2021-09-02 NOTE — PROGRESS NOTES
Call placed to Dr. Da Silva Sailors answering service for admission orders. Will await further orders.

## 2021-09-02 NOTE — PROGRESS NOTES
Physician Progress Note      PATIENTDususan Posey  CSN #:                  [id number]  :                       1955  ADMIT DATE:       2021 7:42 PM  [address] DATE:  RESPONDING  PROVIDER #:        RICHELLE MIR MD          QUERY TEXT:    Pt admitted with COPDE. Pt noted to have hypoxia. If possible, please   document in the progress notes and discharge summary if you are evaluating   and/or treating any of the following: The medical record reflects the following:  Risk Factors: COPD  Clinical Indicators: Rachel CROUCH-CNP in ED note \"at baseline he does   have COPD does not require any oxygen\" and \" Patient hypoxic on arrival here   87% on room air. \", Dr. Jese Serrano in ED Provider Note \"Diminished for sounds   bilaterally. Expiratory wheezing. Mild respiratory distress. Hypoxic on   room air. \", R 18 - 28. Treatment: BIPAP, continuous pulse ox, tele, admitted to   intermediate/progressive care, Duoneb  q 4 hours while awake, IV Solu-Medrol. Thank you,  Catherine Marsh BSN, RN, CDS  [phone]  Options provided:  -- Acute respiratory failure with hypoxia  -- Other - I will add my own diagnosis  -- Disagree - Not applicable / Not valid  -- Disagree - Clinically unable to determine / Unknown  -- Refer to Clinical Documentation Reviewer    PROVIDER RESPONSE TEXT:    This patient is in acute respiratory failure with hypoxia.     Query created by: Melanie Odell on 2021 1:18 PM      Electronically signed by:  Shannan Siegel MD 2021 1:25 PM

## 2021-09-02 NOTE — ED NOTES
Okay to send to floor with oxygen at [facility] per respiratory. Pt tolerated 6lnc to CT and back with no distress shorten of breaths.       Lee Cleary RN  09/02/21 [id number]

## 2021-09-02 NOTE — PATIENT CARE CONFERENCE
[facility] Quality Flow/Interdisciplinary Rounds Progress Note        Quality Flow Rounds held on September 2, 2021    Disciplines Attending:  Bedside Nurse, ,  and Nursing Unit Leadership    Cullen Patrick was admitted on 9/1/2021  7:42 PM    Anticipated Discharge Date:  Expected Discharge Date: 09/04/21    Disposition:    Yung Score:  Yung Scale Score: 23    Readmission Risk              Risk of Unplanned Readmission:  10           Discussed patient goal for the day, patient clinical progression, and barriers to discharge.   The following Goal(s) of the Day/Commitment(s) have been identified:  Diagnostics - Report Results      Margot Singh RN  September 2, 2021

## 2021-09-03 PROCEDURE — 2060000000 HC ICU INTERMEDIATE R&B

## 2021-09-03 PROCEDURE — 94640 AIRWAY INHALATION TREATMENT: CPT

## 2021-09-03 PROCEDURE — 6370000000 HC RX 637 (ALT 250 FOR IP): Performed by: NURSE PRACTITIONER

## 2021-09-03 PROCEDURE — 6360000002 HC RX W HCPCS: Performed by: FAMILY MEDICINE

## 2021-09-03 PROCEDURE — 6370000000 HC RX 637 (ALT 250 FOR IP): Performed by: FAMILY MEDICINE

## 2021-09-03 PROCEDURE — 6360000002 HC RX W HCPCS: Performed by: INTERNAL MEDICINE

## 2021-09-03 PROCEDURE — 2580000003 HC RX 258: Performed by: FAMILY MEDICINE

## 2021-09-03 PROCEDURE — 94660 CPAP INITIATION&MGMT: CPT

## 2021-09-03 RX ORDER — ALBUTEROL SULFATE 2.5 MG/3ML
2.5 SOLUTION RESPIRATORY (INHALATION) 4 TIMES DAILY
Qty: 120 EACH | Refills: 3 | Status: SHIPPED | OUTPATIENT
Start: 2021-09-03

## 2021-09-03 RX ORDER — ALBUTEROL SULFATE 90 UG/1
2 AEROSOL, METERED RESPIRATORY (INHALATION) EVERY 6 HOURS PRN
Qty: 18 G | Refills: 6 | Status: SHIPPED | OUTPATIENT
Start: 2021-09-03 | End: 2022-05-18 | Stop reason: SDUPTHER

## 2021-09-03 RX ORDER — UMECLIDINIUM BROMIDE AND VILANTEROL TRIFENATATE 62.5; 25 UG/1; UG/1
1 POWDER RESPIRATORY (INHALATION) DAILY
Qty: 30 EACH | Refills: 3 | Status: SHIPPED | OUTPATIENT
Start: 2021-09-03 | End: 2022-04-14 | Stop reason: ALTCHOICE

## 2021-09-03 RX ADMIN — AZITHROMYCIN 500 MG: 250 TABLET, FILM COATED ORAL at 10:06

## 2021-09-03 RX ADMIN — METHYLPREDNISOLONE SODIUM SUCCINATE 40 MG: 125 INJECTION, POWDER, FOR SOLUTION INTRAMUSCULAR; INTRAVENOUS at 02:38

## 2021-09-03 RX ADMIN — BUDESONIDE 500 MCG: 0.5 SUSPENSION RESPIRATORY (INHALATION) at 20:05

## 2021-09-03 RX ADMIN — METHYLPREDNISOLONE SODIUM SUCCINATE 40 MG: 125 INJECTION, POWDER, FOR SOLUTION INTRAMUSCULAR; INTRAVENOUS at 16:17

## 2021-09-03 RX ADMIN — ALBUTEROL SULFATE 2.5 MG: 2.5 SOLUTION RESPIRATORY (INHALATION) at 01:00

## 2021-09-03 RX ADMIN — IPRATROPIUM BROMIDE AND ALBUTEROL SULFATE 1 AMPULE: .5; 2.5 SOLUTION RESPIRATORY (INHALATION) at 20:05

## 2021-09-03 RX ADMIN — ENOXAPARIN SODIUM 40 MG: 40 INJECTION SUBCUTANEOUS at 10:09

## 2021-09-03 RX ADMIN — ARFORMOTEROL TARTRATE 15 MCG: 15 SOLUTION RESPIRATORY (INHALATION) at 08:15

## 2021-09-03 RX ADMIN — ATORVASTATIN CALCIUM 20 MG: 20 TABLET, FILM COATED ORAL at 10:07

## 2021-09-03 RX ADMIN — LISINOPRIL 10 MG: 10 TABLET ORAL at 10:08

## 2021-09-03 RX ADMIN — ASPIRIN 81 MG CHEWABLE TABLET 81 MG: 81 TABLET CHEWABLE at 10:06

## 2021-09-03 RX ADMIN — SODIUM CHLORIDE: 9 INJECTION, SOLUTION INTRAVENOUS at 21:25

## 2021-09-03 RX ADMIN — METHYLPREDNISOLONE SODIUM SUCCINATE 40 MG: 125 INJECTION, POWDER, FOR SOLUTION INTRAMUSCULAR; INTRAVENOUS at 10:05

## 2021-09-03 RX ADMIN — IPRATROPIUM BROMIDE AND ALBUTEROL SULFATE 1 AMPULE: .5; 2.5 SOLUTION RESPIRATORY (INHALATION) at 08:15

## 2021-09-03 RX ADMIN — METHYLPREDNISOLONE SODIUM SUCCINATE 40 MG: 125 INJECTION, POWDER, FOR SOLUTION INTRAMUSCULAR; INTRAVENOUS at 21:25

## 2021-09-03 RX ADMIN — IPRATROPIUM BROMIDE AND ALBUTEROL SULFATE 1 AMPULE: .5; 2.5 SOLUTION RESPIRATORY (INHALATION) at 12:29

## 2021-09-03 RX ADMIN — ARFORMOTEROL TARTRATE 15 MCG: 15 SOLUTION RESPIRATORY (INHALATION) at 20:05

## 2021-09-03 RX ADMIN — SODIUM CHLORIDE: 9 INJECTION, SOLUTION INTRAVENOUS at 08:33

## 2021-09-03 RX ADMIN — AMLODIPINE BESYLATE 5 MG: 5 TABLET ORAL at 10:12

## 2021-09-03 RX ADMIN — BUDESONIDE 500 MCG: 0.5 SUSPENSION RESPIRATORY (INHALATION) at 08:15

## 2021-09-03 ASSESSMENT — PAIN SCALES - GENERAL
PAINLEVEL_OUTOF10: 0
PAINLEVEL_OUTOF10: 0

## 2021-09-03 NOTE — PROGRESS NOTES
He looks and feels much better today. Respiratory viral panel positive for rhinovirus.   Vital signs are stable O2 sats 93% on room air  Chest reveals improved aeration with very minimal wheezing  Heart is regular without murmur gallop or rub  Legs reveal no edema  Assessment: Acute exacerbation of COPD by rhinovirus improving  Plan: Switch to oral prednisone tomorrow  Probable discharge tomorrow

## 2021-09-03 NOTE — PROGRESS NOTES
Pulmonary Subsequent Hospital F/U note    Patient is being followed for: copd exacerbation, rhinovirus tracheobronchitis    Interval HPI:  Oxygen has been weaned off  He is feeling better but still having wheezing/dyspnea  +rhinovirus  +cough    ROS:  Denies fever, night sweats  Denies hemoptysis +wheeze  Denies chest pain, edema, palpitations  Denies nausea  No abdominal pain    Exam:  BP (!) 157/72   Pulse 102   Temp 98.4 °F (36.9 °C) (Temporal)   Resp 20   Ht 5' 9\" (1.753 m)   Wt 168 lb 3.2 oz (76.3 kg)   SpO2 94%   BMI 24.84 kg/m²    General: Patient is resting comfortably, no distress, breathing is not labored  HEENT: PERRL, EOMI, MMM, no oral lesions  Neck: supple no adenopathy  CV: RRR without murmur  Lungs: end exp wheeze  Abd: soft, ND, NT, bowel sounds normal  Ext: warm, no edema    Data:    Oximetry:  SpO2 Readings from Last 1 Encounters:   09/03/21 94%       Imaging personally reviewed by myself:  CTA chest     Impression   No evidence of pulmonary embolism or acute pulmonary abnormality.       Moderate emphysema.             Pertinent labs reviewed and noted:  Lab Results   Component Value Date    WBC 14.4 09/01/2021    HGB 16.7 09/01/2021    HCT 49.2 09/01/2021    MCV 89.3 09/01/2021    MCH 30.3 09/01/2021    MCHC 33.9 09/01/2021    RDW 13.1 09/01/2021     09/01/2021    MPV 10.4 09/01/2021     Lab Results   Component Value Date     09/01/2021    K 4.3 09/01/2021    CL 99 09/01/2021    CO2 27 09/01/2021    BUN 9 09/01/2021    CREATININE 0.7 09/01/2021    LABALBU 4.7 09/01/2021    CALCIUM 9.5 09/01/2021    GFRAA >60 09/01/2021    LABGLOM >60 09/01/2021     No results found for: PROTIME, INR    Assessment:  1. COPD with acute exacerbation  2. FRANCOISE not compliant with CPAP treatment  3. Acute respiratory insufficiency with hypoxia   4. Rhinovirus tracheobronchitis    Plan:  1. Oxygen has been weaned off  2. Continue bronchodilators  3. Change to oral prednisone tomorrow  4.  Azithromycin x 5 days total  5. I have ordered him a home nebulizer with albuterol nebs  6. He otherwise should resume Anoro 1 puff daily at discharge. I have also ordered a rescue inhaler.     Likely home over the weekend      Electronically signed by Cyndi Mulligan MD on 9/3/2021 at 2:49 PM

## 2021-09-04 VITALS
BODY MASS INDEX: 24.91 KG/M2 | OXYGEN SATURATION: 93 % | RESPIRATION RATE: 20 BRPM | WEIGHT: 168.2 LBS | HEIGHT: 69 IN | SYSTOLIC BLOOD PRESSURE: 150 MMHG | TEMPERATURE: 98 F | DIASTOLIC BLOOD PRESSURE: 72 MMHG | HEART RATE: 97 BPM

## 2021-09-04 PROCEDURE — 94660 CPAP INITIATION&MGMT: CPT

## 2021-09-04 PROCEDURE — 2580000003 HC RX 258: Performed by: FAMILY MEDICINE

## 2021-09-04 PROCEDURE — 6360000002 HC RX W HCPCS: Performed by: FAMILY MEDICINE

## 2021-09-04 PROCEDURE — 6360000002 HC RX W HCPCS: Performed by: INTERNAL MEDICINE

## 2021-09-04 PROCEDURE — 6370000000 HC RX 637 (ALT 250 FOR IP): Performed by: NURSE PRACTITIONER

## 2021-09-04 PROCEDURE — 6370000000 HC RX 637 (ALT 250 FOR IP): Performed by: FAMILY MEDICINE

## 2021-09-04 PROCEDURE — 94640 AIRWAY INHALATION TREATMENT: CPT

## 2021-09-04 RX ORDER — AZITHROMYCIN 500 MG/1
500 TABLET, FILM COATED ORAL DAILY
Qty: 2 TABLET | Refills: 0 | Status: SHIPPED | OUTPATIENT
Start: 2021-09-05 | End: 2021-09-07

## 2021-09-04 RX ORDER — PREDNISONE 10 MG/1
TABLET ORAL
Qty: 30 TABLET | Refills: 0 | Status: SHIPPED | OUTPATIENT
Start: 2021-09-05 | End: 2021-09-24 | Stop reason: ALTCHOICE

## 2021-09-04 RX ADMIN — AZITHROMYCIN 500 MG: 250 TABLET, FILM COATED ORAL at 08:50

## 2021-09-04 RX ADMIN — LISINOPRIL 10 MG: 10 TABLET ORAL at 08:50

## 2021-09-04 RX ADMIN — ASPIRIN 81 MG CHEWABLE TABLET 81 MG: 81 TABLET CHEWABLE at 08:50

## 2021-09-04 RX ADMIN — ENOXAPARIN SODIUM 40 MG: 40 INJECTION SUBCUTANEOUS at 08:50

## 2021-09-04 RX ADMIN — IPRATROPIUM BROMIDE AND ALBUTEROL SULFATE 1 AMPULE: .5; 2.5 SOLUTION RESPIRATORY (INHALATION) at 09:32

## 2021-09-04 RX ADMIN — BUDESONIDE 500 MCG: 0.5 SUSPENSION RESPIRATORY (INHALATION) at 09:34

## 2021-09-04 RX ADMIN — ARFORMOTEROL TARTRATE 15 MCG: 15 SOLUTION RESPIRATORY (INHALATION) at 09:33

## 2021-09-04 RX ADMIN — SODIUM CHLORIDE: 9 INJECTION, SOLUTION INTRAVENOUS at 10:33

## 2021-09-04 RX ADMIN — PREDNISONE 40 MG: 20 TABLET ORAL at 08:50

## 2021-09-04 RX ADMIN — ATORVASTATIN CALCIUM 20 MG: 20 TABLET, FILM COATED ORAL at 08:50

## 2021-09-04 RX ADMIN — AMLODIPINE BESYLATE 5 MG: 5 TABLET ORAL at 08:50

## 2021-09-04 ASSESSMENT — PAIN SCALES - GENERAL: PAINLEVEL_OUTOF10: 0

## 2021-09-04 NOTE — PROGRESS NOTES
Patient stated that he has not been wearing the bipap and will not wear it tonight. Patient remains on room air.

## 2021-09-04 NOTE — PROGRESS NOTES
Subjective: The patient is awake and alert. No problems overnight. feels much better. Denies chest pain, angina, and dyspnea. Denies abdominal pain. Tolerating diet. No nausea or vomiting. Objective:    BP (!) 150/72   Pulse 97   Temp 98 °F (36.7 °C) (Temporal)   Resp 20   Ht 5' 9\" (1.753 m)   Wt 168 lb 3.2 oz (76.3 kg)   SpO2 93%   BMI 24.84 kg/m²     Heart:  RRR, no murmurs, gallops, or rubs.   Lungs: mild wheeze, rales no rhonchi  Abd: bowel sounds present, nontender, nondistended, no masses  Extrem:  No clubbing, cyanosis, or edema    CBC with Differential:    Lab Results   Component Value Date    WBC 14.4 09/01/2021    RBC 5.51 09/01/2021    HGB 16.7 09/01/2021    HCT 49.2 09/01/2021     09/01/2021    MCV 89.3 09/01/2021    MCH 30.3 09/01/2021    MCHC 33.9 09/01/2021    RDW 13.1 09/01/2021    LYMPHOPCT 9.9 09/01/2021    MONOPCT 9.6 09/01/2021    BASOPCT 0.3 09/01/2021    MONOSABS 1.39 09/01/2021    LYMPHSABS 1.43 09/01/2021    EOSABS 0.29 09/01/2021    BASOSABS 0.04 09/01/2021     CMP:    Lab Results   Component Value Date     09/01/2021    K 4.3 09/01/2021    CL 99 09/01/2021    CO2 27 09/01/2021    BUN 9 09/01/2021    CREATININE 0.7 09/01/2021    GFRAA >60 09/01/2021    LABGLOM >60 09/01/2021    GLUCOSE 112 09/01/2021    PROT 8.6 09/01/2021    LABALBU 4.7 09/01/2021    CALCIUM 9.5 09/01/2021    BILITOT 0.3 09/01/2021    ALKPHOS 76 09/01/2021    AST 28 09/01/2021    ALT 23 09/01/2021        Assessment:    Patient Active Problem List   Diagnosis    Lung nodule    FRANCOISE (obstructive sleep apnea)    COPD exacerbation ([facility] [address]       Plan:  Yarely Noel MD  11:19 AM  9/4/2021

## 2021-09-04 NOTE — PLAN OF CARE
Problem: Falls - Risk of:  Goal: Will remain free from falls  Description: Will remain free from falls  9/4/2021 1151 by Kalyan Sanchez RN  Outcome: Completed  9/4/2021 1150 by Kalyan Sanchez RN  Outcome: Met This Shift     Problem: Falls - Risk of:  Goal: Absence of physical injury  Description: Absence of physical injury  9/4/2021 1151 by Kalyan Sanchez RN  Outcome: Completed  9/4/2021 1150 by Kalyan Sanchez RN  Outcome: Met This Shift

## 2021-09-04 NOTE — PROGRESS NOTES
Israel Cardoso M.D.,[facility]  Agnes Calderón D.O., FCYNTHIA, Vicky Kiran M.D. Sachin Mendoza M.D. Irvin Vanessa D.O. Daily Pulmonary Progress Note    Patient:  Lani Cee 74 y.o. male MRN: [id number]     Date of Service: 9/4/2021      Synopsis     We are following patient for COPD exacerbation    \"CC\" shortness of breath    Code status: Full code      Subjective      Patient was seen and examined ambulating in the room in no apparent distress. He is in isolation for rhinovirus, but denies respiratory difficulty. He says occasionally he feels like he has an end expiratory wheeze. Lung sounds are clear on auscultation. Review of Systems:  Constitutional: Denies fever, weight loss, night sweats, and fatigue  Skin: Denies pigmentation, dark lesions, and rashes   HEENT: Denies hearing loss, tinnitus, ear drainage, epistaxis, sore throat, and hoarseness. Cardiovascular: Denies palpitations, chest pain, and chest pressure. Respiratory: Denies cough, dyspnea at rest, hemoptysis, apnea, and choking.   Gastrointestinal: Denies nausea, vomiting, poor appetite, diarrhea, heartburn or reflux  Genitourinary: Denies dysuria, frequency, urgency or hematuria  Musculoskeletal: Denies myalgias, muscle weakness, and bone pain  Neurological: Denies dizziness, vertigo, headache, and focal weakness  Psychological: Denies anxiety and depression  Endocrine: Denies heat intolerance and cold intolerance  Hematopoietic/Lymphatic: Denies bleeding problems and blood transfusions    24-hour events:  None    Objective   Vitals: BP (!) 178/80   Pulse 87   Temp 97.8 °F (36.6 °C) (Temporal)   Resp 18   Ht 5' 9\" (1.753 m)   Wt 168 lb 3.2 oz (76.3 kg)   SpO2 91%   BMI 24.84 kg/m²     I/O:    Intake/Output Summary (Last 24 hours) at 9/4/2021 0843  Last data filed at 9/4/2021 5781  Gross per 24 hour   Intake 360 ml   Output 3875 ml   Net -3515 ml       Vent Information  Skin Assessment: Clean, dry, & intact  FiO2 : 40 %  SpO2: 91 %  SpO2/FiO2 ratio: 250  I Time/ I Time %: 0.9 s  Mask Type: Full face mask  Mask Size: Large       IPAP: 14 cmH20  CPAP/EPAP: 8 cmH2O     CURRENT MEDS :  Scheduled Meds:   aspirin  81 mg Oral Daily    atorvastatin  20 mg Oral Daily    enoxaparin  40 mg SubCUTAneous Daily    ipratropium-albuterol  1 ampule Inhalation Q4H WA    predniSONE  40 mg Oral Daily    amLODIPine  5 mg Oral Daily    And    lisinopril  10 mg Oral Daily    azithromycin  500 mg Oral Daily    Arformoterol Tartrate  15 mcg Nebulization BID    budesonide  0.5 mg Nebulization BID       Physical Exam:  General Appearance: appears comfortable in no acute distress. HEENT: Normocephalic atraumatic without obvious abnormality   Neck: Lips, mucosa, and tongue normal.  Supple, symmetrical, trachea midline, no adenopathy;thyroid:  no enlargement/tenderness/nodules or JVD. Lung: Breath sounds CTA. Respirations   unlabored. Symmetrical expansion. Heart: RRR, normal S1, S2. No MRG  Abdomen: Soft, NT, ND. BS present x 4 quadrants. No bruit or organomegaly. Extremities: Pedal pulses 2+ symmetric b/l. Extremities normal, no cyanosis, clubbing, or edema. Musculokeletal: No joint swelling, no muscle tenderness. ROM normal in all joints of extremities. Neurologic: Mental status: Alert and Oriented X3 .     Pertinent/ New Labs and Imaging Studies     Imaging Personally Reviewed:  CTA chest  No evidence of pulmonary embolism or acute pulmonary abnormality  Moderate emphysema    ECHO  None on file    Labs:  Lab Results   Component Value Date    WBC 14.4 09/01/2021    HGB 16.7 09/01/2021    HCT 49.2 09/01/2021    MCV 89.3 09/01/2021    MCH 30.3 09/01/2021    MCHC 33.9 09/01/2021    RDW 13.1 09/01/2021     09/01/2021    MPV 10.4 09/01/2021     Lab Results   Component Value Date     09/01/2021    K 4.3 09/01/2021    CL 99 09/01/2021    CO2 27 09/01/2021    BUN 9 09/01/2021    CREATININE 0.7 09/01/2021    LABALBU 4.7 09/01/2021    CALCIUM 9.5 09/01/2021    GFRAA >60 09/01/2021    LABGLOM >60 09/01/2021     No results found for: PROTIME, INR  Recent Labs     09/01/21 2038   PROBNP 512*     No results for input(s): PROCAL in the last 72 hours. This SmartLink has not been configured with any valid records. Micro:  No results for input(s): CULTRESP in the last 72 hours. No results for input(s): LABGRAM in the last 72 hours. No results for input(s): LEGUR in the last 72 hours. No results for input(s): STREPNEUMAGU in the last 72 hours. No results for input(s): LP1UAG in the last 72 hours. Assessment:    COPD with acute exacerbation  FRANCOISE not compliant with CPAP treatment  Acute respiratory insufficiency with hypoxia   Rhinovirus tracheobronchitis      Plan: 1. Oxygen has been weaned off  2. Continue bronchodilators  3. Change to oral prednisone tomorrow, taper written for discharge  4. Azithromycin x 5 days total  5. I have ordered him a home nebulizer with albuterol nebs  6. He otherwise should resume Anoro 1 puff daily at discharge. I have also ordered a rescue inhaler. Okay for discharge per pulmonology    This plan of care was reviewed in collaboration with Dr. Fred Ng  Electronically signed by MIKO Ochoa CNP on 9/4/2021 at 8:43 AM        I personally saw, examined, and cared for the patient. Labs, medications, radiographs reviewed. I agree with history exam and plans detailed in NP note.       Femi Jesus, DO

## 2021-09-04 NOTE — PROGRESS NOTES
Patient discharge instructions given to pt and wife along with printable script. IV taken out at this time, heart monitor taken off. All education and care plan completed. Transport request put in for patient.

## 2021-09-05 PROBLEM — J96.01 ACUTE RESPIRATORY FAILURE WITH HYPOXIA (HCC): Status: ACTIVE | Noted: 2021-09-05

## 2021-09-05 PROBLEM — B34.8 RHINOVIRUS: Status: ACTIVE | Noted: 2021-09-05

## 2021-10-14 NOTE — DISCHARGE SUMMARY
Admit Date: 9/1/2021  7:42 PM   Discharge Date: 9/04/2021    Patient Active Problem List   Diagnosis    Lung nodule    FRANCOISE (obstructive sleep apnea)    COPD exacerbation (HCC)    Acute respiratory failure with hypoxia ([facility] [address]    Rhinovirus        Present on Admission:   COPD exacerbation ([facility] [address]   Acute respiratory failure with hypoxia ([facility] [address]   Rhinovirus        Rachel, [address] Medication Instructions [id number]    Printed on:10/14/21 1209   Medication Information                      albuterol (PROVENTIL) (2.5 MG/3ML) 0.083% nebulizer solution  Take 3 mLs by nebulization 4 times daily             albuterol sulfate  (90 Base) MCG/ACT inhaler  Inhale 2 puffs into the lungs every 6 hours as needed for Wheezing or Shortness of Breath             amLODIPine-benazepril (LOTREL) 5-10 MG per capsule  Take 1 capsule by mouth daily             aspirin 81 MG tablet  Take 81 mg by mouth daily             Multiple Vitamins-Minerals (THERAPEUTIC MULTIVITAMIN-MINERALS) tablet  Take 1 tablet by mouth daily             Polyvinyl Alcohol-Povidone (REFRESH OP)  Apply to eye             simvastatin (ZOCOR) 40 MG tablet  Take 40 mg by mouth daily              umeclidinium-vilanterol (ANORO ELLIPTA) 62.5-25 MCG/INH AEPB inhaler  Inhale 1 puff into the lungs daily                  Hospital Course/Procedures:74year-old with COPD was admitted on 9/1/2021 with increasing shortness of breath and an O2 sat of 87% on room air. Patient was admitted. Pulmonary was consulted and placed patient on oxygen per nasal cannula, IV steroids, budesonide and brovana aerosols,albuterol aerosols and azithromycin. respiratory viral panel was negative for COVID 19 but positive for rhinovirus. CTA was negative for PE. Patient improved rapidly. O2 was discontinued. Patient was discharged home in stable condition on 9/4/2021.       Consultants Following:Pulmonary      Disposition:home    Follow-up:PCP and pulmonary      Gloria Maldonado MD  10/14/2021  12:07 PM

## 2021-10-15 NOTE — ED PROVIDER NOTES
[address]      Pt Name: Rubio De La Torre  MRN: [id number]  [place] 1955  Date of evaluation: 9/1/2021      CHIEF COMPLAINT       Chief Complaint   Patient presents with    Shortness of Breath     pt reports increased sob and cough. HPI  Ruboi De La Torre is a 74 y.o. male who presents to the emergency department with history of COPD with complaints of shortness of breath. Patient states that over the last couple days he is, more short of breath. States he drove here to [place] and started feel more short of breath and therefore drove back here. He states in [place] he did call EMS at one point and they took his oxygen and it was [place]". He does not wear oxygen at baseline. He describes symptoms as moderate, constant, worse with time, nothing makes it better. Smoking cigarettes several years ago. He uses inhaler daily. He denies any chest pain, lightheadedness, fevers, chills. He did get the Covid vaccination. Except as noted above the remainder of the review of systems was reviewed and negative. Review of Systems   Constitutional: Negative for activity change, chills, diaphoresis, fatigue and fever. HENT: Negative for rhinorrhea, sore throat and trouble swallowing. Eyes: Negative for photophobia, pain and redness. Respiratory: Positive for cough and shortness of breath. Negative for apnea, chest tightness and wheezing. Cardiovascular: Negative for chest pain, palpitations and leg swelling. Gastrointestinal: Negative for abdominal pain, constipation, diarrhea, nausea and vomiting. Endocrine: Negative for polyuria. Genitourinary: Negative for difficulty urinating and dysuria. Musculoskeletal: Negative for back pain, neck pain and neck stiffness. Skin: Negative for pallor and rash. Neurological: Negative for dizziness, syncope, weakness, light-headedness and numbness. Psychiatric/Behavioral: Negative for confusion. The patient is not nervous/anxious. Physical Exam  Vitals and nursing note reviewed. Constitutional:       General: He is not in acute distress. Appearance: He is well-developed. Comments: Awake and alert. Sitting in the gurney in no obvious distress. HENT:      Head: Normocephalic and atraumatic. Mouth/Throat:      Mouth: Mucous membranes are moist.      Pharynx: No oropharyngeal exudate. Eyes:      General: No scleral icterus. Pupils: Pupils are equal, round, and reactive to light. Cardiovascular:      Rate and Rhythm: Normal rate and regular rhythm. Heart sounds: Normal heart sounds. No murmur heard. Comments: 2+ radial and dorsal pedis pulses bilaterally  Pulmonary:      Effort: Respiratory distress present. Breath sounds: No wheezing. Comments: Diminished for sounds bilaterally. Expiratory wheezing. Mild respiratory distress. Hypoxic on room air. Abdominal:      Palpations: Abdomen is soft. Tenderness: There is no abdominal tenderness. There is no guarding or rebound. Musculoskeletal:         General: No tenderness or deformity. Normal range of motion. Cervical back: Normal range of motion and neck supple. Right lower leg: No edema. Left lower leg: No edema. Skin:     General: Skin is warm and dry. Capillary Refill: Capillary refill takes less than 2 seconds. Findings: No rash. Neurological:      General: No focal deficit present. Mental Status: He is alert and oriented to person, place, and time. Cranial Nerves: No cranial nerve deficit. Sensory: No sensory deficit. Motor: No weakness or abnormal muscle tone. Psychiatric:         Mood and Affect: Mood normal.         Behavior: Behavior normal.          Procedures     MDM   This is a 78-year-old male with history of COPD presents to the emerge department hypoxia worsening shortness of breath. In the emergency department he is awake and alert, hemodynamic stable, afebrile. Mild respiratory distress with expiratory wheezing initial examination. Hypoxic on room air. Placed on BiPAP with improvement in his hypoxia. Administered duo nebs as well as Solu-Medrol. Trial patient off BiPAP began became hypoxic and then worsening distress and therefore placed back on BiPAP. CTA showed no signs of pulmonary embolism. No signs of pneumonia. Covid test was negative. Discussed with patient's PCP who accepts the patient for further evaluation.                  --------------------------------------------- PAST HISTORY ---------------------------------------------  Past Medical History:  has a past medical history of Hypertension and Sleep apnea. Past Surgical History:  has a past surgical history that includes eye surgery; Retinal detachment surgery (Right); and repair retinal tear/hole (Right). Social History:  reports that he quit smoking about 5 years ago. His smoking use included cigarettes. He started smoking about 59 years ago. He has a 56.00 pack-year smoking history. He has never used smokeless tobacco. He reports current alcohol use. He reports that he does not use drugs. Family History: Family history is unknown by patient. The patients home medications have been reviewed. Allergies: Patient has no known allergies.     -------------------------------------------------- RESULTS -------------------------------------------------    LABS:  Results for orders placed or performed during the hospital encounter of 09/01/21   COVID-19, Rapid    Specimen: Nasopharyngeal Swab   Result Value Ref Range    SARS-CoV-2, NAAT Not Detected Not Detected   Troponin   Result Value Ref Range    Troponin, High Sensitivity 12 (H) 0 - 11 ng/L   CBC Auto Differential   Result Value Ref Range    WBC 14.4 (H) 4.5 - 11.5 E9/L    RBC 5.51 3.80 - 5.80 E12/L    Hemoglobin 16.7 (H) 12.5 - 16.5 g/dL    Hematocrit 49.2 37.0 - 54.0 %    MCV 89.3 80.0 - 99.9 fL    MCH 30.3 26.0 - 35.0 pg    MCHC 33.9 32.0 - 34.5 %    RDW 13.1 11.5 - 15.0 fL    Platelets 464 081 - 094 E9/L    MPV 10.4 7.0 - 12.0 fL    Neutrophils % 77.9 43.0 - 80.0 %    Immature Granulocytes % 0.3 0.0 - 5.0 %    Lymphocytes % 9.9 (L) 20.0 - 42.0 %    Monocytes % 9.6 2.0 - 12.0 %    Eosinophils % 2.0 0.0 - 6.0 %    Basophils % 0.3 0.0 - 2.0 %    Neutrophils Absolute 11.21 (H) 1.80 - 7.30 E9/L    Immature Granulocytes # 0.05 E9/L    Lymphocytes Absolute 1.43 (L) 1.50 - 4.00 E9/L    Monocytes Absolute 1.39 (H) 0.10 - 0.95 E9/L    Eosinophils Absolute 0.29 0.05 - 0.50 E9/L    Basophils Absolute 0.04 0.00 - 0.20 E9/L   Comprehensive Metabolic Panel   Result Value Ref Range    Sodium 140 132 - 146 mmol/L    Potassium 4.3 3.5 - 5.0 mmol/L    Chloride 99 98 - 107 mmol/L    CO2 27 22 - 29 mmol/L    Anion Gap 14 7 - 16 mmol/L    Glucose 112 (H) 74 - 99 mg/dL    BUN 9 6 - 23 mg/dL    CREATININE 0.7 0.7 - 1.2 mg/dL    GFR Non-African American >60 >=60 mL/min/1.73    GFR African American >60     Calcium 9.5 8.6 - 10.2 mg/dL    Total Protein 8.6 (H) 6.4 - 8.3 g/dL    Albumin 4.7 3.5 - 5.2 g/dL    Total Bilirubin 0.3 0.0 - 1.2 mg/dL    Alkaline Phosphatase 76 40 - 129 U/L    ALT 23 0 - 40 U/L    AST 28 0 - 39 U/L   Magnesium   Result Value Ref Range    Magnesium 1.6 1.6 - 2.6 mg/dL   Brain Natriuretic Peptide   Result Value Ref Range    Pro- (H) 0 - 125 pg/mL   APTT   Result Value Ref Range    aPTT 34.4 24.5 - 35.1 sec   EKG 12 Lead   Result Value Ref Range    Ventricular Rate 78 BPM    Atrial Rate 78 BPM    P-R Interval 166 ms    QRS Duration 86 ms    Q-T Interval 398 ms    QTc Calculation (Bazett) 453 ms    P Axis 77 degrees    R Axis 64 degrees    T Axis 45 degrees       RADIOLOGY:  CTA PULMONARY W CONTRAST   Final Result   No evidence of pulmonary embolism or acute pulmonary abnormality. Moderate emphysema.          XR CHEST PORTABLE   Final Result   No acute process. EKG:  This EKG is signed and interpreted by me. Rate: 78bpm  Rhythm: sinus  Interpretation: Normal axis. No ST segment changes. Comparison: no previous EKG      ------------------------- NURSING NOTES AND VITALS REVIEWED ---------------------------  Date / Time Roomed:  9/1/2021  7:42 PM  ED Bed Assignment:  [id number]    The nursing notes within the ED encounter and vital signs as below have been reviewed. Patient Vitals for the past 24 hrs:   BP Temp Pulse Resp SpO2   09/02/21 0017 (!) 141/114 -- 93 24 100 %   09/01/21 2045 (!) 144/87 -- 97 27 98 %   09/01/21 1955 -- -- -- 28 --   09/01/21 1940 (!) 205/100 -- 107 20 95 %   09/01/21 1931 -- 96.8 °F (36 °C) 107 -- (!) 87 %       Oxygen Saturation Interpretation: abNormal    ------------------------------------------ PROGRESS NOTES ------------------------------------------    Counseling:  I have spoken with the patient and discussed todays results, in addition to providing specific details for the plan of care and counseling regarding the diagnosis and prognosis. Their questions are answered at this time and they are agreeable with the plan of admission.    --------------------------------- ADDITIONAL PROVIDER NOTES ---------------------------------  Consultations:  Spoke with Dr. Ramón Larson. Discussed case. They will admit the patient. This patient's ED course included: a personal history and physicial examination, re-evaluation prior to disposition, multiple bedside re-evaluations, IV medications, cardiac monitoring and continuous pulse oximetry    This patient has remained hemodynamically stable during their ED course. Diagnosis:  1. COPD exacerbation ([address]    2. Hypoxia        Disposition:  Patient's disposition: Admit to telemetry  Patient's condition is stable.          Danny Cook DO  Resident  09/02/21 9460 Unknown

## 2022-09-14 ENCOUNTER — HOSPITAL ENCOUNTER (OUTPATIENT)
Dept: CT IMAGING | Age: 67
Discharge: HOME | End: 2022-09-16
Payer: MEDICARE

## 2022-09-14 DIAGNOSIS — R91.1 PULMONARY NODULE: ICD-10-CM

## 2022-09-14 PROCEDURE — 71250 CT THORAX DX C-: CPT

## 2022-10-21 ENCOUNTER — HOSPITAL ENCOUNTER (OUTPATIENT)
Age: 67
Discharge: HOME | End: 2022-10-21
Payer: MEDICARE

## 2022-10-21 LAB — POTASSIUM SERPL-SCNC: 3.9 MMOL/L (ref 3.5–5)

## 2022-10-21 PROCEDURE — 84132 ASSAY OF SERUM POTASSIUM: CPT

## 2022-10-21 PROCEDURE — 36415 COLL VENOUS BLD VENIPUNCTURE: CPT

## 2023-05-19 ENCOUNTER — HOSPITAL ENCOUNTER (OUTPATIENT)
Dept: CT IMAGING | Age: 68
End: 2023-05-19
Payer: MEDICARE

## 2023-05-19 DIAGNOSIS — R91.1 PULMONARY NODULE: ICD-10-CM

## 2023-05-19 PROCEDURE — 71250 CT THORAX DX C-: CPT

## 2023-10-17 ENCOUNTER — ANESTHESIA EVENT (OUTPATIENT)
Dept: ENDOSCOPY | Age: 68
End: 2023-10-17
Payer: MEDICARE

## 2023-10-17 ENCOUNTER — ANESTHESIA (OUTPATIENT)
Dept: ENDOSCOPY | Age: 68
End: 2023-10-17
Payer: MEDICARE

## 2023-10-17 ENCOUNTER — HOSPITAL ENCOUNTER (OUTPATIENT)
Age: 68
Setting detail: OUTPATIENT SURGERY
Discharge: HOME | End: 2023-10-17
Attending: SURGERY | Admitting: SURGERY
Payer: MEDICARE

## 2023-10-17 VITALS
HEART RATE: 65 BPM | RESPIRATION RATE: 16 BRPM | TEMPERATURE: 97.1 F | DIASTOLIC BLOOD PRESSURE: 73 MMHG | OXYGEN SATURATION: 98 % | WEIGHT: 168 LBS | BODY MASS INDEX: 24.88 KG/M2 | SYSTOLIC BLOOD PRESSURE: 122 MMHG | HEIGHT: 69 IN

## 2023-10-17 PROCEDURE — 7100000011 HC PHASE II RECOVERY - ADDTL 15 MIN: Performed by: SURGERY

## 2023-10-17 PROCEDURE — 2709999900 HC NON-CHARGEABLE SUPPLY: Performed by: SURGERY

## 2023-10-17 PROCEDURE — 6360000002 HC RX W HCPCS: Performed by: NURSE ANESTHETIST, CERTIFIED REGISTERED

## 2023-10-17 PROCEDURE — 3700000001 HC ADD 15 MINUTES (ANESTHESIA): Performed by: SURGERY

## 2023-10-17 PROCEDURE — 2580000003 HC RX 258: Performed by: NURSE ANESTHETIST, CERTIFIED REGISTERED

## 2023-10-17 PROCEDURE — 3700000000 HC ANESTHESIA ATTENDED CARE: Performed by: SURGERY

## 2023-10-17 PROCEDURE — 3609027000 HC COLONOSCOPY: Performed by: SURGERY

## 2023-10-17 PROCEDURE — 6370000000 HC RX 637 (ALT 250 FOR IP): Performed by: SURGERY

## 2023-10-17 PROCEDURE — 7100000010 HC PHASE II RECOVERY - FIRST 15 MIN: Performed by: SURGERY

## 2023-10-17 RX ORDER — SODIUM CHLORIDE 9 MG/ML
INJECTION, SOLUTION INTRAVENOUS CONTINUOUS
Status: DISCONTINUED | OUTPATIENT
Start: 2023-10-17 | End: 2023-10-17 | Stop reason: HOSPADM

## 2023-10-17 RX ORDER — SIMETHICONE 20 MG/.3ML
EMULSION ORAL PRN
Status: DISCONTINUED | OUTPATIENT
Start: 2023-10-17 | End: 2023-10-17 | Stop reason: ALTCHOICE

## 2023-10-17 RX ORDER — PROPOFOL 10 MG/ML
INJECTION, EMULSION INTRAVENOUS PRN
Status: DISCONTINUED | OUTPATIENT
Start: 2023-10-17 | End: 2023-10-17 | Stop reason: SDUPTHER

## 2023-10-17 RX ORDER — SODIUM CHLORIDE 9 MG/ML
INJECTION, SOLUTION INTRAVENOUS CONTINUOUS PRN
Status: DISCONTINUED | OUTPATIENT
Start: 2023-10-17 | End: 2023-10-17 | Stop reason: SDUPTHER

## 2023-10-17 RX ADMIN — PROPOFOL 350 MG: 10 INJECTION, EMULSION INTRAVENOUS at 06:59

## 2023-10-17 RX ADMIN — SODIUM CHLORIDE: 9 INJECTION, SOLUTION INTRAVENOUS at 06:54

## 2023-10-17 ASSESSMENT — LIFESTYLE VARIABLES: SMOKING_STATUS: 0

## 2023-10-17 ASSESSMENT — PAIN - FUNCTIONAL ASSESSMENT: PAIN_FUNCTIONAL_ASSESSMENT: 0-10

## 2023-10-17 NOTE — ANESTHESIA POSTPROCEDURE EVALUATION
Department of Anesthesiology  Postprocedure Note    Patient: Josselyn Mccormack  MRN: [id number]  YOB: 1955  Date of evaluation: 10/17/2023      Procedure Summary     Date: 10/17/23 Room / Location: [place] [address] / [facility]    Anesthesia Start: 2183 Anesthesia Stop: 0715    Procedure: [address], NOT HIGH RISK Diagnosis:       Screen for colon cancer      (Screen for colon cancer [Z12.11])    Surgeons: Amira Tucker MD Responsible Provider: Erica Perez MD    Anesthesia Type: MAC ASA Status: 3          Anesthesia Type: No value filed.     Kaitlin Phase I: Kaitlin Score: 10    Kaitlin Phase II:        Anesthesia Post Evaluation    Patient location during evaluation: PACU  Patient participation: complete - patient participated  Level of consciousness: awake  Airway patency: patent  Nausea & Vomiting: no nausea and no vomiting  Complications: no  Cardiovascular status: hemodynamically stable  Respiratory status: acceptable  Hydration status: euvolemic  Pain management: adequate

## 2023-10-17 NOTE — DISCHARGE INSTRUCTIONS
Follow all instructions carefully:      Restrictions: Do not drive a car or operate machinery for the remainder of the day. Diet:    Keep first meal light   Resume usual diet         Medications:   Current Discharge Medication List             Details   ! ! prednisoLONE acetate (PRED FORTE) 1 % ophthalmic suspension       STIOLTO RESPIMAT 2.5-2.5 MCG/ACT AERS Inhale 2 puffs into the lungs daily  Qty: 3 each, Refills: 4      albuterol sulfate HFA (PROVENTIL;VENTOLIN;PROAIR) 108 (90 Base) MCG/ACT inhaler Inhale 2 puffs into the lungs every 6 hours as needed for Wheezing or Shortness of Breath  Qty: 54 g, Refills: 4      !! PREDNISOLONE ACETATE OP Apply to eye      albuterol (PROVENTIL) (2.5 MG/3ML) 0.083% nebulizer solution Take 3 mLs by nebulization 4 times daily  Qty: 120 each, Refills: 3      simvastatin (ZOCOR) 40 MG tablet Take 1 tablet by mouth daily      Polyvinyl Alcohol-Povidone (REFRESH OP) Apply to eye      Multiple Vitamins-Minerals (THERAPEUTIC MULTIVITAMIN-MINERALS) tablet Take 1 tablet by mouth daily      amLODIPine-benazepril (LOTREL) 5-10 MG per capsule Take 1 capsule by mouth daily       ! ! - Potential duplicate medications found. Please discuss with provider. Resume all home medications    Treatment for common after affects:   Sore throat, treat with throat and/or gargle with warm salt water. Mild abdominal pain, cramping, bloating, or excessive gas. Rest and eat light. Symptoms to watch for and report to the physician:   Chest Pain    Vomiting   Difficulty breathing   Severe abdominal pain or bloating   Large amount of rectal bleeding, small amount of bleeding is normal   Chills or fever within 24 hours of your procedure     Follow up Care:   If you had biopsies done call to make an appointment for office visit in 2 weeks    [address] [facility], [place]  [phone]     If problems or questions arise call the physician, If unable to reach your physician

## 2023-10-17 NOTE — OP NOTE
Venita Hood  YOB: 1955  [id number]    Pre-operative Diagnosis: Screening    Post-operative Diagnosis: Mild sigmoid diverticulosis otherwise normal    Procedure: Colonoscopy     Anesthesia: [facility]    Surgeon: Sindi De Paz MD    Assistant: None    Estimated Blood Loss: none    Complications: none    Specimens: None    Procedure:   Pt was taken to the endoscopy suite and placed on the table in a left lateral decubitus position. LMAC anesthesia was administered. A digital rectal examination revealed no gross blood, normal tone, no mass was palpated. A lubricated colonoscope was inserted and advanced to the cecum without difficulty. The ileocecal valve and appendiceal orifice were identified and photographed. Prep was good. Cecum, ascending colon, hepatic flexure, transverse colon, splenic flexure, descending colon, sigmoid colon were all extensively inspected. Mild diverticulosis of the sigmoid was noted. There was no other pathology present. The proximal rectum was normal. The distal rectum was normal. The colon was deflated. The scope was removed. The patient tolerated the procedure well.     Impression: Diverticulosis otherwise normal      Plan:   High-fiber diet, repeat 10 years unless symptoms develop sooner    Chris Webb MD, MD,  10/17/2023, 6:20 AM

## 2023-10-17 NOTE — H&P
Yves Ramos R  68  M  1955        Allergy/Adv: No Known Allergies  [facility]  [address] [place] [address] [facility], [address] [facility]     General Surgery Visit/H&P   Signed     Patient: Clair Ramirez  MR#: [id number]  : 1955  Acct:[id number]  Age/Sex: 77 / M  ADM Date: 23  Loc: SPS. 514            Provider Location:   cc: ~           Intake  Vital Signs                23  12:12  Height    5 ft 9 in  Weight   170 lb  BMI        25.1  BP         167/90 H  Mean Arterial Pressure             115  Pulse Rate        78     Intake  Visit Reasons: SPS. COLON  Current Pain: No  Date of Last Colonoscopy: 13 (Estimated)  Allergies     No Known Allergies Allergy (Verified 23 12:16)        Safety Concerns: Feels Safe At This Time     [address]  Medical History (Updated 23 @ 12:28 by Blas Gowers, MD)     COPD (chronic obstructive pulmonary disease)        Social History (Updated 23 @ 12:17 by Elenita Alcantar RN)  Types:  Coffee   Smoking Status:  Never Smoker   Alcohol Intake:  Never            HPI  History of Present Illness:   74year old male referred for colonoscopy. Previous colonoscopy: Normal with me 10 years ago. Denies any bleeding or changes in bowel habits. No family history of colon cancer. Denies any unintentional weight loss. ROS  Const'l  Review of Systems Unobtainable:  All Systems Reviewed and Are Unremarkable Except as Noted in HPI and Below  Constitutional: Denies Anorexia, Denies Fatigue, Denies Night Sweats, Denies Weight Gain, Denies Weight Loss and Denies Other (Anxiety.)  Eyes  Eyes: Reports ROS Eyes System Reviewed and No Additional Complaints, Except as Documented  ENT  ENT: Reports ROS ENT System Reviewed and No Additional Complaints, Except as Documented  Cardio  Cardiovascular: Reports ROS CARD System Reviewed and No Additional Complaints, Except as documented, Denies Chest Pain, Denies Dyspnea, Denies Lightheadedness and Denies

## 2023-10-17 NOTE — ANESTHESIA PRE PROCEDURE
Gamaliel Phelan MD   10/17/2023    Chart reviewed . Patient assessed before induction. I agree with the above note.   Sharmaine Medina, MIKO - CRNA

## 2023-10-17 NOTE — PROGRESS NOTES
Abdomen soft with active bowel sounds.
procedure to notify you if your arrival time changes    [x] If you receive a survey after surgery we would greatly appreciate your comments    [x] Please notify surgeon if you develop any illness between now and time of surgery (cold, cough, sore throat, fever, nausea, vomiting) or any signs of infections  including skin, wounds, and dental.    []  The [facility]patient [facility] is available to fill your prescription here on your day of surgery, ask your preop nurse for details

## 2024-03-18 RX ORDER — SIMVASTATIN 40 MG
40 TABLET ORAL DAILY
Qty: 90 TABLET | Refills: 3 | Status: CANCELLED | OUTPATIENT
Start: 2024-03-18

## 2024-03-18 RX ORDER — SIMVASTATIN 40 MG
40 TABLET ORAL DAILY
Qty: 90 TABLET | Refills: 3 | Status: SHIPPED | OUTPATIENT
Start: 2024-03-18

## 2024-03-18 NOTE — TELEPHONE ENCOUNTER
Patient called for medication refill    Requested Prescriptions     Pending Prescriptions Disp Refills    simvastatin (ZOCOR) 40 MG tablet 90 tablet 3     Sig: Take 1 tablet by mouth daily     Last Appt: Visit date not found   Next Appt: 5/14/2024

## 2024-03-18 NOTE — TELEPHONE ENCOUNTER
PATIENT CALLED NEEDS  REFILL ON SIMVASTATIN NOT SURE IF ITS 20 MG OR 40 MG    NEEDS ORDERED THRU [facility].     TOLD PT WE WOULD CALL HIM BACK WITH PROPER DOSE   [phone]

## 2024-04-02 RX ORDER — AMLODIPINE BESYLATE AND BENAZEPRIL HYDROCHLORIDE 5; 10 MG/1; MG/1
1 CAPSULE ORAL DAILY
Qty: 90 CAPSULE | Refills: 3 | Status: SHIPPED | OUTPATIENT
Start: 2024-04-02

## 2024-04-02 NOTE — TELEPHONE ENCOUNTER
Patient called for medication refill    Requested Prescriptions     Pending Prescriptions Disp Refills    amLODIPine-benazepril (LOTREL) 5-10 MG per capsule 90 capsule 3     Sig: Take 1 capsule by mouth daily     Last Appt: Visit date not found   Next Appt: 5/14/2024

## 2024-05-06 ENCOUNTER — OFFICE VISIT (OUTPATIENT)
Age: 69
End: 2024-05-06
Payer: COMMERCIAL

## 2024-05-06 VITALS
DIASTOLIC BLOOD PRESSURE: 86 MMHG | BODY MASS INDEX: 26.84 KG/M2 | HEART RATE: 113 BPM | OXYGEN SATURATION: 92 % | RESPIRATION RATE: 18 BRPM | TEMPERATURE: 99.3 F | SYSTOLIC BLOOD PRESSURE: 164 MMHG | WEIGHT: 181.2 LBS | HEIGHT: 69 IN

## 2024-05-06 DIAGNOSIS — T78.40XD ALLERGY, SUBSEQUENT ENCOUNTER: ICD-10-CM

## 2024-05-06 DIAGNOSIS — J43.9 PULMONARY EMPHYSEMA, UNSPECIFIED EMPHYSEMA TYPE (HCC): Primary | ICD-10-CM

## 2024-05-06 DIAGNOSIS — F41.1 GENERALIZED ANXIETY DISORDER: ICD-10-CM

## 2024-05-06 PROBLEM — T78.40XA ALLERGIES: Status: ACTIVE | Noted: 2024-05-06

## 2024-05-06 PROCEDURE — 1123F ACP DISCUSS/DSCN MKR DOCD: CPT | Performed by: FAMILY MEDICINE

## 2024-05-06 PROCEDURE — 3023F SPIROM DOC REV: CPT | Performed by: FAMILY MEDICINE

## 2024-05-06 PROCEDURE — 99214 OFFICE O/P EST MOD 30 MIN: CPT | Performed by: FAMILY MEDICINE

## 2024-05-06 PROCEDURE — G8419 CALC BMI OUT NRM PARAM NOF/U: HCPCS | Performed by: FAMILY MEDICINE

## 2024-05-06 PROCEDURE — G8428 CUR MEDS NOT DOCUMENT: HCPCS | Performed by: FAMILY MEDICINE

## 2024-05-06 PROCEDURE — 1036F TOBACCO NON-USER: CPT | Performed by: FAMILY MEDICINE

## 2024-05-06 PROCEDURE — 3017F COLORECTAL CA SCREEN DOC REV: CPT | Performed by: FAMILY MEDICINE

## 2024-05-06 RX ORDER — FLUTICASONE PROPIONATE 50 MCG
2 SPRAY, SUSPENSION (ML) NASAL DAILY
Qty: 16 G | Refills: 3 | Status: SHIPPED | OUTPATIENT
Start: 2024-05-06

## 2024-05-06 RX ORDER — ALPRAZOLAM 0.5 MG/1
0.5 TABLET ORAL 3 TIMES DAILY PRN
Qty: 90 TABLET | Refills: 0 | Status: SHIPPED | OUTPATIENT
Start: 2024-05-06 | End: 2024-06-05

## 2024-05-06 ASSESSMENT — ENCOUNTER SYMPTOMS
COUGH: 1
RHINORRHEA: 1
SHORTNESS OF BREATH: 1

## 2024-05-06 NOTE — PROGRESS NOTES
Yves Ramos (:  1955) is a 68 y.o. male,Established patient, here for evaluation of the following chief complaint(s):  Cough and Chest Congestion      Assessment & Plan   1. Pulmonary emphysema, unspecified emphysema type (HCC) patient was instructed to resume his Bevespi follow-up if no better.  Also instructed to call his pulmonologist if no better  -     fluticasone (FLONASE) 50 MCG/ACT nasal spray; 2 sprays by Each Nostril route daily, Disp-16 g, R-3Normal  2. Generalized anxiety disorder  -     ALPRAZolam (XANAX) 0.5 MG tablet; Take 1 tablet by mouth 3 times daily as needed for Sleep or Anxiety for up to 30 days. Max Daily Amount: 1.5 mg, Disp-90 tablet, R-0Normal OARRS was checked  3. Allergy, subsequent encounter  -     fluticasone (FLONASE) 50 MCG/ACT nasal spray; 2 sprays by Each Nostril route daily, Disp-16 g, R-3Normal      No follow-ups on file.       Subjective   Cough  Associated symptoms include rhinorrhea and shortness of breath. Pertinent negatives include no chest pain.   Chest Congestion  Associated symptoms include congestion and coughing. Pertinent negatives include no chest pain.     68-year-old with known COPD comes in due to ongoing cough.  Has been treated several times with antibiotics and steroids.  He has DuoNeb for his nebulizer at home.  For some reason he discontinued his Bevespi which may be contributing to his ongoing cough.  Also pulmonologist physicians assistant gave him a prescription for Xanax as needed 0.5 mg in February when he was there for an appointment.  He needs a refill  Review of Systems   Constitutional:  Positive for activity change.   HENT:  Positive for congestion and rhinorrhea.    Respiratory:  Positive for cough and shortness of breath.    Cardiovascular:  Negative for chest pain and palpitations.          Objective BP (!) 164/86   Pulse (!) 113   Temp 99.3 °F (37.4 °C)   Resp 18   Ht 1.753 m (5' 9\")   Wt 82.2 kg (181 lb 3.2 oz)   SpO2 92%

## 2024-05-28 PROBLEM — J44.9 STAGE 2 MODERATE COPD BY GOLD CLASSIFICATION (HCC): Status: ACTIVE | Noted: 2024-05-28

## 2024-05-28 PROBLEM — G47.33 OSA ON CPAP: Status: ACTIVE | Noted: 2024-05-28

## 2024-05-28 PROBLEM — F41.9 ANXIETY: Status: ACTIVE | Noted: 2024-05-28

## 2024-05-28 PROBLEM — R91.1 PULMONARY NODULE: Status: ACTIVE | Noted: 2024-05-28

## 2024-05-28 SDOH — ECONOMIC STABILITY: TRANSPORTATION INSECURITY
IN THE PAST 12 MONTHS, HAS LACK OF TRANSPORTATION KEPT YOU FROM MEETINGS, WORK, OR FROM GETTING THINGS NEEDED FOR DAILY LIVING?: NO

## 2024-05-28 SDOH — ECONOMIC STABILITY: FOOD INSECURITY: WITHIN THE PAST 12 MONTHS, YOU WORRIED THAT YOUR FOOD WOULD RUN OUT BEFORE YOU GOT MONEY TO BUY MORE.: NEVER TRUE

## 2024-05-28 SDOH — HEALTH STABILITY: PHYSICAL HEALTH: ON AVERAGE, HOW MANY DAYS PER WEEK DO YOU ENGAGE IN MODERATE TO STRENUOUS EXERCISE (LIKE A BRISK WALK)?: 3 DAYS

## 2024-05-28 SDOH — ECONOMIC STABILITY: HOUSING INSECURITY
IN THE LAST 12 MONTHS, WAS THERE A TIME WHEN YOU DID NOT HAVE A STEADY PLACE TO SLEEP OR SLEPT IN A SHELTER (INCLUDING NOW)?: NO

## 2024-05-28 SDOH — ECONOMIC STABILITY: INCOME INSECURITY: HOW HARD IS IT FOR YOU TO PAY FOR THE VERY BASICS LIKE FOOD, HOUSING, MEDICAL CARE, AND HEATING?: NOT HARD AT ALL

## 2024-05-28 SDOH — ECONOMIC STABILITY: FOOD INSECURITY: WITHIN THE PAST 12 MONTHS, THE FOOD YOU BOUGHT JUST DIDN'T LAST AND YOU DIDN'T HAVE MONEY TO GET MORE.: NEVER TRUE

## 2024-05-28 SDOH — HEALTH STABILITY: PHYSICAL HEALTH: ON AVERAGE, HOW MANY MINUTES DO YOU ENGAGE IN EXERCISE AT THIS LEVEL?: 40 MIN

## 2024-05-28 ASSESSMENT — PATIENT HEALTH QUESTIONNAIRE - PHQ9
2. FEELING DOWN, DEPRESSED OR HOPELESS: NOT AT ALL
SUM OF ALL RESPONSES TO PHQ QUESTIONS 1-9: 0
SUM OF ALL RESPONSES TO PHQ9 QUESTIONS 1 & 2: 0
1. LITTLE INTEREST OR PLEASURE IN DOING THINGS: NOT AT ALL
SUM OF ALL RESPONSES TO PHQ QUESTIONS 1-9: 0

## 2024-05-28 ASSESSMENT — LIFESTYLE VARIABLES
HOW OFTEN DO YOU HAVE SIX OR MORE DRINKS ON ONE OCCASION: 1
HOW MANY STANDARD DRINKS CONTAINING ALCOHOL DO YOU HAVE ON A TYPICAL DAY: 0
HOW OFTEN DO YOU HAVE A DRINK CONTAINING ALCOHOL: 1
HOW OFTEN DO YOU HAVE A DRINK CONTAINING ALCOHOL: NEVER
HOW MANY STANDARD DRINKS CONTAINING ALCOHOL DO YOU HAVE ON A TYPICAL DAY: PATIENT DOES NOT DRINK

## 2024-05-31 ENCOUNTER — OFFICE VISIT (OUTPATIENT)
Age: 69
End: 2024-05-31

## 2024-05-31 VITALS
OXYGEN SATURATION: 93 % | DIASTOLIC BLOOD PRESSURE: 80 MMHG | HEIGHT: 69 IN | SYSTOLIC BLOOD PRESSURE: 130 MMHG | TEMPERATURE: 98 F | HEART RATE: 71 BPM | BODY MASS INDEX: 26.66 KG/M2 | RESPIRATION RATE: 18 BRPM | WEIGHT: 180 LBS

## 2024-05-31 DIAGNOSIS — R53.83 OTHER FATIGUE: ICD-10-CM

## 2024-05-31 DIAGNOSIS — J43.9 PULMONARY EMPHYSEMA, UNSPECIFIED EMPHYSEMA TYPE (HCC): ICD-10-CM

## 2024-05-31 DIAGNOSIS — Z00.00 INITIAL MEDICARE ANNUAL WELLNESS VISIT: Primary | ICD-10-CM

## 2024-05-31 DIAGNOSIS — I10 ESSENTIAL HYPERTENSION, BENIGN: ICD-10-CM

## 2024-05-31 DIAGNOSIS — Z12.5 SCREENING FOR PROSTATE CANCER: ICD-10-CM

## 2024-05-31 DIAGNOSIS — E78.5 HYPERLIPIDEMIA, UNSPECIFIED HYPERLIPIDEMIA TYPE: ICD-10-CM

## 2024-05-31 DIAGNOSIS — Z87.891 PERSONAL HISTORY OF TOBACCO USE: ICD-10-CM

## 2024-05-31 LAB
BASOPHILS ABSOLUTE: 0.04 K/UL (ref 0–0.2)
BASOPHILS RELATIVE PERCENT: 1 % (ref 0–2)
EOSINOPHILS ABSOLUTE: 0.46 K/UL (ref 0.05–0.5)
EOSINOPHILS RELATIVE PERCENT: 5 % (ref 0–6)
HCT VFR BLD CALC: 44.6 % (ref 37–54)
HEMOGLOBIN: 14.8 G/DL (ref 12.5–16.5)
IMMATURE GRANULOCYTES %: 0 % (ref 0–5)
IMMATURE GRANULOCYTES ABSOLUTE: 0.03 K/UL (ref 0–0.58)
LYMPHOCYTES ABSOLUTE: 1.42 K/UL (ref 1.5–4)
LYMPHOCYTES RELATIVE PERCENT: 16 % (ref 20–42)
MCH RBC QN AUTO: 31.6 PG (ref 26–35)
MCHC RBC AUTO-ENTMCNC: 33.2 G/DL (ref 32–34.5)
MCV RBC AUTO: 95.3 FL (ref 80–99.9)
MONOCYTES ABSOLUTE: 1.2 K/UL (ref 0.1–0.95)
MONOCYTES RELATIVE PERCENT: 14 % (ref 2–12)
NEUTROPHILS ABSOLUTE: 5.68 K/UL (ref 1.8–7.3)
NEUTROPHILS RELATIVE PERCENT: 64 % (ref 43–80)
PDW BLD-RTO: 14.3 % (ref 11.5–15)
PLATELET # BLD: 228 K/UL (ref 130–450)
PMV BLD AUTO: 10.5 FL (ref 7–12)
PROSTATE SPECIFIC ANTIGEN: 0.88 NG/ML (ref 0–4)
RBC # BLD: 4.68 M/UL (ref 3.8–5.8)
WBC # BLD: 8.8 K/UL (ref 4.5–11.5)

## 2024-05-31 NOTE — PATIENT INSTRUCTIONS
high cholesterol. If you think you may have a problem with alcohol or drug use, talk to your doctor.   Medicines    Take your medicines exactly as prescribed. Call your doctor if you think you are having a problem with your medicine.     If your doctor recommends aspirin, take the amount directed each day. Make sure you take aspirin and not another kind of pain reliever, such as acetaminophen (Tylenol).   When should you call for help?   Call 911 if you have symptoms of a heart attack. These may include:    Chest pain or pressure, or a strange feeling in the chest.     Sweating.     Shortness of breath.     Pain, pressure, or a strange feeling in the back, neck, jaw, or upper belly or in one or both shoulders or arms.     Lightheadedness or sudden weakness.     A fast or irregular heartbeat.   After you call 911, the  may tell you to chew 1 adult-strength or 2 to 4 low-dose aspirin. Wait for an ambulance. Do not try to drive yourself.  Watch closely for changes in your health, and be sure to contact your doctor if you have any problems.  Where can you learn more?  Go to https://www.[employer].net/patientEd and enter F075 to learn more about \"A Healthy Heart: Care Instructions.\"  Current as of: June 24, 2023               Content Version: 14.0  © 8378-3651 [employer].   Care instructions adapted under license by [employer]. If you have questions about a medical condition or this instruction, always ask your healthcare professional. [employer] disclaims any warranty or liability for your use of this information.      Personalized Preventive Plan for Yves Ramos - 5/31/2024  Medicare offers a range of preventive health benefits. Some of the tests and screenings are paid in full while other may be subject to a deductible, co-insurance, and/or copay.    Some of these benefits include a comprehensive review of your medical history including lifestyle, illnesses that may run in your

## 2024-05-31 NOTE — PROGRESS NOTES
Medicare Annual Wellness Visit    Yves Ramos is here for Medicare AWV    Assessment & Plan   Initial Medicare annual wellness visit  Pulmonary emphysema, unspecified emphysema type (HCC) just saw the pulmonologist on a new inhaler Breztri doing quite well screening CAT scan is scheduled for tomorrow continue current medication continue follow-up with pulmonology  Essential hypertension, benign  -     Comprehensive Metabolic Panel; Future  Hyperlipidemia, unspecified hyperlipidemia type  -     Lipid Panel; Future  Screening for prostate cancer  -     PSA Screening; Future  Other fatigue  -     CBC with Auto Differential; Future  -     TSH; Future  Personal history of tobacco use  -     WA VISIT TO DISCUSS LUNG CA SCREEN W LDCT  Recommendations for Preventive Services Due: see orders and patient instructions/AVS.  Recommended screening schedule for the next 5-10 years is provided to the patient in written form: see Patient Instructions/AVS.     Return in 6 months (on 11/30/2024) for Medicare Annual Wellness Visit in 1 year.     Subjective   68-year-old with hypertension COPD hyperlipidemia comes in for his Medicare annual well visit and fasting blood work.  Just saw pulmonology who placed him on Breztri inhaler which is working quite well.  He is scheduled for his annual CT of the chest tomorrow colonoscopy was done in October was normal at age 68 he will not need another.  He is also going to get update his pneumococcal vaccination since the last 1 was at age 61.  He sees the eye doctor regularly.    Patient's complete Health Risk Assessment and screening values have been reviewed and are found in Flowsheets. The following problems were reviewed today and where indicated follow up appointments were made and/or referrals ordered.    Positive Risk Factor Screenings with Interventions:                 Dentist Screen:  Have you seen the dentist within the past year?: (!) No    Intervention:  Advised to schedule with

## 2024-06-01 ENCOUNTER — HOSPITAL ENCOUNTER (OUTPATIENT)
Dept: CT IMAGING | Age: 69
End: 2024-06-01
Payer: COMMERCIAL

## 2024-06-01 DIAGNOSIS — R91.1 PULMONARY NODULE: ICD-10-CM

## 2024-06-01 LAB
ALBUMIN: 4.1 G/DL (ref 3.5–5.2)
ALP BLD-CCNC: 59 U/L (ref 40–129)
ALT SERPL-CCNC: 30 U/L (ref 0–40)
ANION GAP SERPL CALCULATED.3IONS-SCNC: 18 MMOL/L (ref 7–16)
AST SERPL-CCNC: 26 U/L (ref 0–39)
BILIRUB SERPL-MCNC: 0.8 MG/DL (ref 0–1.2)
BUN BLDV-MCNC: 19 MG/DL (ref 6–23)
CALCIUM SERPL-MCNC: 9 MG/DL (ref 8.6–10.2)
CHLORIDE BLD-SCNC: 97 MMOL/L (ref 98–107)
CHOLESTEROL, TOTAL: 198 MG/DL
CO2: 21 MMOL/L (ref 22–29)
CREAT SERPL-MCNC: 0.7 MG/DL (ref 0.7–1.2)
GFR, ESTIMATED: >90 ML/MIN/1.73M2
GLUCOSE BLD-MCNC: 96 MG/DL (ref 74–99)
HDLC SERPL-MCNC: 75 MG/DL
LDL CHOLESTEROL: 104 MG/DL
POTASSIUM SERPL-SCNC: 3.8 MMOL/L (ref 3.5–5)
SODIUM BLD-SCNC: 136 MMOL/L (ref 132–146)
TOTAL PROTEIN: 6.4 G/DL (ref 6.4–8.3)
TRIGL SERPL-MCNC: 97 MG/DL
TSH SERPL DL<=0.05 MIU/L-ACNC: 1.15 UIU/ML (ref 0.27–4.2)
VLDLC SERPL CALC-MCNC: 19 MG/DL

## 2024-06-01 PROCEDURE — 71250 CT THORAX DX C-: CPT

## 2024-06-30 PROBLEM — Z12.5 SCREENING FOR PROSTATE CANCER: Status: RESOLVED | Noted: 2024-05-31 | Resolved: 2024-06-30

## 2024-07-09 RX ORDER — AMLODIPINE BESYLATE AND BENAZEPRIL HYDROCHLORIDE 5; 10 MG/1; MG/1
1 CAPSULE ORAL DAILY
Qty: 5 CAPSULE | Refills: 0 | Status: SHIPPED | OUTPATIENT
Start: 2024-07-09

## 2024-07-09 RX ORDER — SIMVASTATIN 40 MG
40 TABLET ORAL DAILY
Qty: 5 TABLET | Refills: 0 | Status: SHIPPED | OUTPATIENT
Start: 2024-07-09

## 2024-07-09 NOTE — TELEPHONE ENCOUNTER
Patient called for medication refill    Requested Prescriptions     Pending Prescriptions Disp Refills    amLODIPine-benazepril (LOTREL) 5-10 MG per capsule 5 capsule 0     Sig: Take 1 capsule by mouth daily    simvastatin (ZOCOR) 40 MG tablet 5 tablet 0     Sig: Take 1 tablet by mouth daily     Last Appt: 5/31/2024   Next Appt: 12/9/2024

## 2024-07-24 DIAGNOSIS — F51.01 PRIMARY INSOMNIA: Primary | ICD-10-CM

## 2024-07-24 RX ORDER — ALPRAZOLAM 0.5 MG/1
0.5 TABLET ORAL NIGHTLY PRN
Qty: 90 TABLET | Refills: 0 | Status: SHIPPED | OUTPATIENT
Start: 2024-07-24 | End: 2024-10-22

## 2024-07-24 RX ORDER — ALPRAZOLAM 0.5 MG/1
0.5 TABLET ORAL NIGHTLY PRN
COMMUNITY
End: 2024-07-24 | Stop reason: SDUPTHER

## 2024-07-24 NOTE — TELEPHONE ENCOUNTER
Patient called for medication refill    Requested Prescriptions     Pending Prescriptions Disp Refills    ALPRAZolam (XANAX) 0.5 MG tablet 90 tablet 0     Sig: Take 1 tablet by mouth nightly as needed for Sleep or Anxiety for up to 90 days. Max Daily Amount: 0.5 mg     Last Appt: 5/31/2024   Next Appt: 12/9/2024       PT WOULD ALSO LIKE THE FLUTICASONE REORDERED TO [facility].       [facility]

## 2024-08-12 DIAGNOSIS — T78.40XD ALLERGY, SUBSEQUENT ENCOUNTER: ICD-10-CM

## 2024-08-12 DIAGNOSIS — J43.9 PULMONARY EMPHYSEMA, UNSPECIFIED EMPHYSEMA TYPE (HCC): ICD-10-CM

## 2024-08-12 RX ORDER — FLUTICASONE PROPIONATE 50 MCG
2 SPRAY, SUSPENSION (ML) NASAL DAILY
Qty: 16 G | Refills: 3 | Status: SHIPPED | OUTPATIENT
Start: 2024-08-12

## 2024-08-12 NOTE — TELEPHONE ENCOUNTER
Patient called for medication refill    Requested Prescriptions     Pending Prescriptions Disp Refills    fluticasone (FLONASE) 50 MCG/ACT nasal spray 16 g 3     Si sprays by Each Nostril route daily     Last Appt: 2024   Next Appt: 2024

## 2024-11-22 ENCOUNTER — TELEPHONE (OUTPATIENT)
Age: 69
End: 2024-11-22

## 2024-11-22 DIAGNOSIS — F51.01 PRIMARY INSOMNIA: Primary | ICD-10-CM

## 2024-11-22 RX ORDER — ALPRAZOLAM 0.5 MG
0.5 TABLET ORAL DAILY
Qty: 30 TABLET | Refills: 2 | Status: SHIPPED | OUTPATIENT
Start: 2024-11-22 | End: 2025-02-20

## 2024-11-22 NOTE — TELEPHONE ENCOUNTER
PT calls and asks for his alprazolam filled. Script is not in digital chart and not in paper chart. IT seems he may wants a refill early?  OARRS needs ran.

## 2024-12-09 ENCOUNTER — OFFICE VISIT (OUTPATIENT)
Age: 69
End: 2024-12-09
Payer: COMMERCIAL

## 2024-12-09 VITALS
DIASTOLIC BLOOD PRESSURE: 78 MMHG | OXYGEN SATURATION: 96 % | BODY MASS INDEX: 27.4 KG/M2 | RESPIRATION RATE: 18 BRPM | HEIGHT: 69 IN | SYSTOLIC BLOOD PRESSURE: 138 MMHG | WEIGHT: 185 LBS | HEART RATE: 101 BPM | TEMPERATURE: 98.3 F

## 2024-12-09 DIAGNOSIS — E78.5 HYPERLIPIDEMIA, UNSPECIFIED HYPERLIPIDEMIA TYPE: ICD-10-CM

## 2024-12-09 DIAGNOSIS — I10 ESSENTIAL HYPERTENSION, BENIGN: Primary | ICD-10-CM

## 2024-12-09 DIAGNOSIS — I10 ESSENTIAL HYPERTENSION, BENIGN: ICD-10-CM

## 2024-12-09 DIAGNOSIS — Z79.899 ENCOUNTER FOR LONG-TERM (CURRENT) USE OF MEDICATIONS: ICD-10-CM

## 2024-12-09 DIAGNOSIS — J43.9 PULMONARY EMPHYSEMA, UNSPECIFIED EMPHYSEMA TYPE (HCC): ICD-10-CM

## 2024-12-09 PROBLEM — J96.01 ACUTE RESPIRATORY FAILURE WITH HYPOXIA: Status: RESOLVED | Noted: 2021-09-05 | Resolved: 2024-12-09

## 2024-12-09 LAB
ALBUMIN: 4.5 G/DL (ref 3.5–5.2)
ALP BLD-CCNC: 73 U/L (ref 40–129)
ALT SERPL-CCNC: 58 U/L (ref 0–40)
ANION GAP SERPL CALCULATED.3IONS-SCNC: 15 MMOL/L (ref 7–16)
AST SERPL-CCNC: 47 U/L (ref 0–39)
BILIRUB SERPL-MCNC: 0.5 MG/DL (ref 0–1.2)
BUN BLDV-MCNC: 19 MG/DL (ref 6–23)
CALCIUM SERPL-MCNC: 9.5 MG/DL (ref 8.6–10.2)
CHLORIDE BLD-SCNC: 100 MMOL/L (ref 98–107)
CHOLESTEROL, TOTAL: 207 MG/DL
CO2: 24 MMOL/L (ref 22–29)
CREAT SERPL-MCNC: 0.8 MG/DL (ref 0.7–1.2)
GFR, ESTIMATED: >90 ML/MIN/1.73M2
GLUCOSE BLD-MCNC: 105 MG/DL (ref 74–99)
HDLC SERPL-MCNC: 51 MG/DL
LDL CHOLESTEROL: 131 MG/DL
POTASSIUM SERPL-SCNC: 4.2 MMOL/L (ref 3.5–5)
SODIUM BLD-SCNC: 139 MMOL/L (ref 132–146)
TOTAL PROTEIN: 7.7 G/DL (ref 6.4–8.3)
TRIGL SERPL-MCNC: 125 MG/DL
VLDLC SERPL CALC-MCNC: 25 MG/DL

## 2024-12-09 PROCEDURE — 1160F RVW MEDS BY RX/DR IN RCRD: CPT | Performed by: FAMILY MEDICINE

## 2024-12-09 PROCEDURE — 3078F DIAST BP <80 MM HG: CPT | Performed by: FAMILY MEDICINE

## 2024-12-09 PROCEDURE — 1036F TOBACCO NON-USER: CPT | Performed by: FAMILY MEDICINE

## 2024-12-09 PROCEDURE — G8419 CALC BMI OUT NRM PARAM NOF/U: HCPCS | Performed by: FAMILY MEDICINE

## 2024-12-09 PROCEDURE — G8484 FLU IMMUNIZE NO ADMIN: HCPCS | Performed by: FAMILY MEDICINE

## 2024-12-09 PROCEDURE — G8427 DOCREV CUR MEDS BY ELIG CLIN: HCPCS | Performed by: FAMILY MEDICINE

## 2024-12-09 PROCEDURE — 99214 OFFICE O/P EST MOD 30 MIN: CPT | Performed by: FAMILY MEDICINE

## 2024-12-09 PROCEDURE — 1159F MED LIST DOCD IN RCRD: CPT | Performed by: FAMILY MEDICINE

## 2024-12-09 PROCEDURE — 3023F SPIROM DOC REV: CPT | Performed by: FAMILY MEDICINE

## 2024-12-09 PROCEDURE — 1123F ACP DISCUSS/DSCN MKR DOCD: CPT | Performed by: FAMILY MEDICINE

## 2024-12-09 PROCEDURE — 3075F SYST BP GE 130 - 139MM HG: CPT | Performed by: FAMILY MEDICINE

## 2024-12-09 PROCEDURE — 3017F COLORECTAL CA SCREEN DOC REV: CPT | Performed by: FAMILY MEDICINE

## 2024-12-09 RX ORDER — BUDESONIDE, GLYCOPYRROLATE, AND FORMOTEROL FUMARATE 160; 9; 4.8 UG/1; UG/1; UG/1
AEROSOL, METERED RESPIRATORY (INHALATION) 2 TIMES DAILY
COMMUNITY
Start: 2024-10-16 | End: 2024-12-11

## 2024-12-09 NOTE — PROGRESS NOTES
Yves Ramos (:  1955) is a 69 y.o. male,Established patient, here for evaluation of the following chief complaint(s):  6 Month Follow-Up         Assessment & Plan  Essential hypertension, benign   Chronic, at goal (stable), continue current treatment plan    Orders:    Comprehensive Metabolic Panel; Future    Hyperlipidemia, unspecified hyperlipidemia type   Chronic, at goal (stable), continue current treatment plan    Orders:    Lipid Panel; Future    Pulmonary emphysema, unspecified emphysema type (HCC)   Monitored by specialist- no acute findings meriting change in the plan much improved on Breztri inhaler         Encounter for long-term (current) use of medications       Orders:    Comprehensive Metabolic Panel; Future      No follow-ups on file.       Subjective   HPI  69-year-old comes in for his 6-month checkup.  He is treated for hypertension hyperlipidemia COPD.  Sees pulmonary on a regular basis for pulmonary nodules last CT in  was stable.  They recently started him on Breztri inhaler which has made a world of difference.  Otherwise he is feeling well.  Colonoscopy last year was normal he will not need another.  Eye exams are up-to-date.  Review of Systems   All other systems reviewed and are negative.         Objective /78   Pulse (!) 101   Temp 98.3 °F (36.8 °C)   Resp 18   Ht 1.753 m (5' 9\")   Wt 83.9 kg (185 lb)   SpO2 96%   BMI 27.32 kg/m²    Physical Exam  Vitals reviewed.   Constitutional:       General: He is not in acute distress.     Appearance: He is not ill-appearing or toxic-appearing.   Eyes:      General: No scleral icterus.     Extraocular Movements: Extraocular movements intact.      Conjunctiva/sclera: Conjunctivae normal.      Pupils: Pupils are equal, round, and reactive to light.   Neck:      Vascular: No carotid bruit.   Cardiovascular:      Rate and Rhythm: Normal rate and regular rhythm.      Pulses: Normal pulses.      Heart sounds: Normal heart

## 2024-12-09 NOTE — ASSESSMENT & PLAN NOTE
Monitored by specialist- no acute findings meriting change in the plan much improved on Breztri inhaler

## 2025-02-04 RX ORDER — AMLODIPINE AND BENAZEPRIL HYDROCHLORIDE 5; 10 MG/1; MG/1
1 CAPSULE ORAL DAILY
Qty: 90 CAPSULE | Refills: 3 | Status: SHIPPED | OUTPATIENT
Start: 2025-02-04

## 2025-02-04 RX ORDER — SIMVASTATIN 40 MG
40 TABLET ORAL DAILY
Qty: 90 TABLET | Refills: 3 | Status: SHIPPED | OUTPATIENT
Start: 2025-02-04

## 2025-02-04 NOTE — TELEPHONE ENCOUNTER
Name of Medication(s) Requested:  Requested Prescriptions     Pending Prescriptions Disp Refills    simvastatin (ZOCOR) 40 MG tablet [Pharmacy Med Name: Simvastatin 40 MG Oral Tablet] 90 tablet 3     Sig: Take 1 tablet by mouth daily    amLODIPine-benazepril (LOTREL) 5-10 MG per capsule [Pharmacy Med Name: amLODIPine Besy-Benazepril HCl 5-10 MG Oral Capsule] 90 capsule 3     Sig: Take 1 capsule by mouth daily       Medication is on current medication list Yes    Dosage and directions were verified? Yes    Quantity verified: 90 day supply     Pharmacy Verified?  Yes    Last Appointment:  12/9/2024    Future appts:  Future Appointments   Date Time Provider Department Center   6/24/2025  8:00 AM Lamont Price MD [facility]        (If no appt send self scheduling link. .REFILLAPPT)  Scheduling request sent?     [x] Yes  [] No    Does patient need updated?  [] Yes  [x] No

## 2025-06-21 SDOH — HEALTH STABILITY: PHYSICAL HEALTH: ON AVERAGE, HOW MANY DAYS PER WEEK DO YOU ENGAGE IN MODERATE TO STRENUOUS EXERCISE (LIKE A BRISK WALK)?: 1 DAY

## 2025-06-21 SDOH — ECONOMIC STABILITY: INCOME INSECURITY: IN THE LAST 12 MONTHS, WAS THERE A TIME WHEN YOU WERE NOT ABLE TO PAY THE MORTGAGE OR RENT ON TIME?: NO

## 2025-06-21 SDOH — HEALTH STABILITY: PHYSICAL HEALTH: ON AVERAGE, HOW MANY MINUTES DO YOU ENGAGE IN EXERCISE AT THIS LEVEL?: 0 MIN

## 2025-06-21 SDOH — ECONOMIC STABILITY: FOOD INSECURITY: WITHIN THE PAST 12 MONTHS, YOU WORRIED THAT YOUR FOOD WOULD RUN OUT BEFORE YOU GOT MONEY TO BUY MORE.: NEVER TRUE

## 2025-06-21 SDOH — ECONOMIC STABILITY: TRANSPORTATION INSECURITY
IN THE PAST 12 MONTHS, HAS THE LACK OF TRANSPORTATION KEPT YOU FROM MEDICAL APPOINTMENTS OR FROM GETTING MEDICATIONS?: NO

## 2025-06-21 SDOH — ECONOMIC STABILITY: FOOD INSECURITY: WITHIN THE PAST 12 MONTHS, THE FOOD YOU BOUGHT JUST DIDN'T LAST AND YOU DIDN'T HAVE MONEY TO GET MORE.: NEVER TRUE

## 2025-06-21 ASSESSMENT — PATIENT HEALTH QUESTIONNAIRE - PHQ9
1. LITTLE INTEREST OR PLEASURE IN DOING THINGS: NOT AT ALL
SUM OF ALL RESPONSES TO PHQ QUESTIONS 1-9: 0
2. FEELING DOWN, DEPRESSED OR HOPELESS: NOT AT ALL

## 2025-06-21 ASSESSMENT — LIFESTYLE VARIABLES
HOW OFTEN DO YOU HAVE A DRINK CONTAINING ALCOHOL: NEVER
HOW OFTEN DO YOU HAVE A DRINK CONTAINING ALCOHOL: 1
HOW MANY STANDARD DRINKS CONTAINING ALCOHOL DO YOU HAVE ON A TYPICAL DAY: PATIENT DOES NOT DRINK
HOW MANY STANDARD DRINKS CONTAINING ALCOHOL DO YOU HAVE ON A TYPICAL DAY: 0
HOW OFTEN DO YOU HAVE SIX OR MORE DRINKS ON ONE OCCASION: 1

## 2025-06-24 ENCOUNTER — OFFICE VISIT (OUTPATIENT)
Age: 70
End: 2025-06-24

## 2025-06-24 VITALS
TEMPERATURE: 98.4 F | RESPIRATION RATE: 18 BRPM | SYSTOLIC BLOOD PRESSURE: 136 MMHG | HEART RATE: 72 BPM | BODY MASS INDEX: 27.52 KG/M2 | OXYGEN SATURATION: 97 % | WEIGHT: 185.8 LBS | DIASTOLIC BLOOD PRESSURE: 84 MMHG | HEIGHT: 69 IN

## 2025-06-24 DIAGNOSIS — Z12.5 SCREENING FOR PROSTATE CANCER: ICD-10-CM

## 2025-06-24 DIAGNOSIS — I10 ESSENTIAL HYPERTENSION, BENIGN: ICD-10-CM

## 2025-06-24 DIAGNOSIS — E78.5 HYPERLIPIDEMIA, UNSPECIFIED HYPERLIPIDEMIA TYPE: ICD-10-CM

## 2025-06-24 DIAGNOSIS — Z00.00 MEDICARE ANNUAL WELLNESS VISIT, SUBSEQUENT: Primary | ICD-10-CM

## 2025-06-24 DIAGNOSIS — J43.9 PULMONARY EMPHYSEMA, UNSPECIFIED EMPHYSEMA TYPE (HCC): ICD-10-CM

## 2025-06-24 LAB
CHOLESTEROL, TOTAL: 209 MG/DL
HDLC SERPL-MCNC: 57 MG/DL
LDL CHOLESTEROL: 132 MG/DL
PROSTATE SPECIFIC ANTIGEN: 1.47 NG/ML (ref 0–4)
TRIGL SERPL-MCNC: 103 MG/DL
VLDLC SERPL CALC-MCNC: 21 MG/DL

## 2025-06-24 NOTE — PATIENT INSTRUCTIONS
with added sugar. These include candy, desserts, and soda pop.   Heart-healthy lifestyle    If your doctor recommends it, get more exercise. For many people, walking is a good choice. Or you may want to swim, bike, or do other activities. Bit by bit, increase the time you're active every day. Try for at least 30 minutes on most days of the week.     Try to quit or cut back on using tobacco and other nicotine products. This includes smoking and vaping. If you need help quitting, talk to your doctor about stop-smoking programs and medicines. These can increase your chances of quitting for good. Quitting is one of the most important things you can do to protect your heart. It is never too late to quit. Try to avoid secondhand smoke too.     Stay at a weight that's healthy for you. Talk to your doctor if you need help losing weight.     Try to get 7 to 9 hours of sleep each night.     Limit alcohol to 2 drinks a day for men and 1 drink a day for women. Too much alcohol can cause health problems.     Manage other health problems such as diabetes, high blood pressure, and high cholesterol. If you think you may have a problem with alcohol or drug use, talk to your doctor.   Medicines    Take your medicines exactly as prescribed. Call your doctor if you think you are having a problem with your medicine.     If your doctor recommends aspirin, take the amount directed each day. Make sure you take aspirin and not another kind of pain reliever, such as acetaminophen (Tylenol).   When should you call for help?   Call 911 if you have symptoms of a heart attack. These may include:    Chest pain or pressure, or a strange feeling in the chest.     Sweating.     Shortness of breath.     Pain, pressure, or a strange feeling in the back, neck, jaw, or upper belly or in one or both shoulders or arms.     Lightheadedness or sudden weakness.     A fast or irregular heartbeat.   After you call 911, the  may tell you to chew 1

## 2025-06-24 NOTE — PROGRESS NOTES
Medicare Annual Wellness Visit    Yves Ramos is here for Medicare AWV    Assessment & Plan   Medicare annual wellness visit, subsequent  Essential hypertension, benign  Hyperlipidemia, unspecified hyperlipidemia type  -     Lipid Panel; Future  Pulmonary emphysema, unspecified emphysema type (HCC) now following with [facility] I believe he has a CT and PFTs ordered he will follow-up with them in 3 months currently stable on medication continue the same meds follow-up here in 6  Screening for prostate cancer  -     PSA Screening; Future       Return in about 6 months (around 12/24/2025) for AWV 1 year.     Subjective   69-year-old comes in for a subsequent Medicare annual wellness treated for hypertension hyperlipidemia and severe COPD he is now seeing pulmonology [facility] also sleep apnea he is compliant with his CPAP machine.  Colonoscopy I believe 2023 was normal he will be due again in 2033.  Recently was in [facility] they are going to schedule his he has screening CT of his chest I think they also schedule him for pulmonary function test.  Also asked him to talk to them about his CPAP machine he has not had a sleep study in over 10 years.  Sounds like he may be getting some limb movement disorder associated with the sleep apnea    Patient's complete Health Risk Assessment and screening values have been reviewed and are found in Flowsheets. The following problems were reviewed today and where indicated follow up appointments were made and/or referrals ordered.    Positive Risk Factor Screenings with Interventions:              Inactivity:  On average, how many days per week do you engage in moderate to strenuous exercise (like a brisk walk)?: (Patient-Rptd) 1 day (!) Abnormal  On average, how many minutes do you engage in exercise at this level?: (Patient-Rptd) 0 min  Interventions:  Significant COPD however symptoms have improved he states he is going to start walking on his treadmill

## (undated) DEVICE — GRADUATE TRIANG MEASURE 1000ML BLK PRNT

## (undated) DEVICE — SPONGE GZ W4XL4IN RAYON POLY CVR W/NONWOVEN FAB STRL 2/PK